# Patient Record
Sex: FEMALE | Race: WHITE | NOT HISPANIC OR LATINO
[De-identification: names, ages, dates, MRNs, and addresses within clinical notes are randomized per-mention and may not be internally consistent; named-entity substitution may affect disease eponyms.]

---

## 2017-04-11 ENCOUNTER — APPOINTMENT (OUTPATIENT)
Dept: GASTROENTEROLOGY | Facility: CLINIC | Age: 67
End: 2017-04-11

## 2017-04-11 VITALS
DIASTOLIC BLOOD PRESSURE: 84 MMHG | WEIGHT: 165 LBS | RESPIRATION RATE: 12 BRPM | BODY MASS INDEX: 28.17 KG/M2 | SYSTOLIC BLOOD PRESSURE: 134 MMHG | HEART RATE: 70 BPM | HEIGHT: 64 IN

## 2017-04-11 DIAGNOSIS — Z82.0 FAMILY HISTORY OF EPILEPSY AND OTHER DISEASES OF THE NERVOUS SYSTEM: ICD-10-CM

## 2017-04-11 DIAGNOSIS — Z82.3 FAMILY HISTORY OF STROKE: ICD-10-CM

## 2017-04-11 DIAGNOSIS — Z78.9 OTHER SPECIFIED HEALTH STATUS: ICD-10-CM

## 2017-04-11 DIAGNOSIS — E11.9 TYPE 2 DIABETES MELLITUS W/OUT COMPLICATIONS: ICD-10-CM

## 2017-04-11 DIAGNOSIS — Z86.39 PERSONAL HISTORY OF OTHER ENDOCRINE, NUTRITIONAL AND METABOLIC DISEASE: ICD-10-CM

## 2017-04-11 DIAGNOSIS — Z87.891 PERSONAL HISTORY OF NICOTINE DEPENDENCE: ICD-10-CM

## 2017-04-11 RX ORDER — FLUTICASONE PROPIONATE 50 UG/1
50 SPRAY, METERED NASAL
Qty: 16 | Refills: 0 | Status: ACTIVE | COMMUNITY
Start: 2017-02-13

## 2017-04-11 RX ORDER — PAROXETINE HYDROCHLORIDE 20 MG/1
20 TABLET, FILM COATED ORAL
Qty: 90 | Refills: 0 | Status: ACTIVE | COMMUNITY
Start: 2015-11-16

## 2017-04-11 RX ORDER — ATORVASTATIN CALCIUM 40 MG/1
40 TABLET, FILM COATED ORAL
Qty: 90 | Refills: 0 | Status: ACTIVE | COMMUNITY
Start: 2015-11-16

## 2017-04-11 RX ORDER — METHYLPREDNISOLONE 4 MG/1
4 TABLET ORAL
Qty: 21 | Refills: 0 | Status: ACTIVE | COMMUNITY
Start: 2017-02-13

## 2017-04-11 RX ORDER — LOSARTAN POTASSIUM 100 MG/1
100 TABLET, FILM COATED ORAL
Qty: 90 | Refills: 0 | Status: ACTIVE | COMMUNITY
Start: 2016-02-18

## 2017-05-22 ENCOUNTER — MEDICATION RENEWAL (OUTPATIENT)
Age: 67
End: 2017-05-22

## 2017-05-22 RX ORDER — POLYETHYLENE GLYCOL 3350, SODIUM SULFATE, SODIUM CHLORIDE, POTASSIUM CHLORIDE, ASCORBIC ACID, SODIUM ASCORBATE 7.5-2.691G
100 KIT ORAL
Qty: 1 | Refills: 0 | Status: ACTIVE | COMMUNITY
Start: 2017-05-22 | End: 1900-01-01

## 2017-05-23 ENCOUNTER — MEDICATION RENEWAL (OUTPATIENT)
Age: 67
End: 2017-05-23

## 2017-05-23 RX ORDER — SODIUM SULFATE, POTASSIUM SULFATE, MAGNESIUM SULFATE 17.5; 3.13; 1.6 G/ML; G/ML; G/ML
17.5-3.13-1.6 SOLUTION, CONCENTRATE ORAL
Qty: 1 | Refills: 0 | Status: ACTIVE | COMMUNITY
Start: 2017-05-23 | End: 1900-01-01

## 2017-06-17 DIAGNOSIS — Z12.11 ENCOUNTER FOR SCREENING FOR MALIGNANT NEOPLASM OF COLON: ICD-10-CM

## 2017-06-17 DIAGNOSIS — R19.5 OTHER FECAL ABNORMALITIES: ICD-10-CM

## 2017-06-17 LAB
ANION GAP SERPL CALC-SCNC: 17 MMOL/L
BASOPHILS # BLD AUTO: 0.03 K/UL
BASOPHILS NFR BLD AUTO: 0.4 %
BUN SERPL-MCNC: 26 MG/DL
CALCIUM SERPL-MCNC: 10 MG/DL
CHLORIDE SERPL-SCNC: 102 MMOL/L
CO2 SERPL-SCNC: 24 MMOL/L
CREAT SERPL-MCNC: 0.92 MG/DL
EOSINOPHIL # BLD AUTO: 0.4 K/UL
EOSINOPHIL NFR BLD AUTO: 5.3 %
GLUCOSE SERPL-MCNC: 135 MG/DL
HCT VFR BLD CALC: 40 %
HGB BLD-MCNC: 12.9 G/DL
IMM GRANULOCYTES NFR BLD AUTO: 0 %
INR PPP: 0.89 RATIO
LYMPHOCYTES # BLD AUTO: 3.18 K/UL
LYMPHOCYTES NFR BLD AUTO: 42.3 %
MAN DIFF?: NORMAL
MCHC RBC-ENTMCNC: 30.5 PG
MCHC RBC-ENTMCNC: 32.3 GM/DL
MCV RBC AUTO: 94.6 FL
MONOCYTES # BLD AUTO: 0.57 K/UL
MONOCYTES NFR BLD AUTO: 7.6 %
NEUTROPHILS # BLD AUTO: 3.33 K/UL
NEUTROPHILS NFR BLD AUTO: 44.4 %
PLATELET # BLD AUTO: 286 K/UL
POTASSIUM SERPL-SCNC: 5.2 MMOL/L
PT BLD: 10 SEC
RBC # BLD: 4.23 M/UL
RBC # FLD: 13.7 %
SODIUM SERPL-SCNC: 143 MMOL/L
WBC # FLD AUTO: 7.51 K/UL

## 2017-06-20 ENCOUNTER — OUTPATIENT (OUTPATIENT)
Dept: OUTPATIENT SERVICES | Facility: HOSPITAL | Age: 67
LOS: 1 days | End: 2017-06-20
Payer: MEDICARE

## 2017-06-20 ENCOUNTER — APPOINTMENT (OUTPATIENT)
Dept: GASTROENTEROLOGY | Facility: GI CENTER | Age: 67
End: 2017-06-20

## 2017-06-20 ENCOUNTER — RESULT REVIEW (OUTPATIENT)
Age: 67
End: 2017-06-20

## 2017-06-20 DIAGNOSIS — Z12.11 ENCOUNTER FOR SCREENING FOR MALIGNANT NEOPLASM OF COLON: ICD-10-CM

## 2017-06-20 PROBLEM — R19.5 FECAL OCCULT BLOOD TEST POSITIVE: Status: ACTIVE | Noted: 2017-04-11

## 2017-06-20 PROCEDURE — 88305 TISSUE EXAM BY PATHOLOGIST: CPT | Mod: 26

## 2017-06-20 PROCEDURE — 45385 COLONOSCOPY W/LESION REMOVAL: CPT

## 2017-06-20 PROCEDURE — 88305 TISSUE EXAM BY PATHOLOGIST: CPT

## 2017-06-20 RX ORDER — POLYETHYLENE GLYCOL 3350, SODIUM SULFATE, SODIUM CHLORIDE, POTASSIUM CHLORIDE, ASCORBIC ACID, SODIUM ASCORBATE 7.5-2.691G
100 KIT ORAL
Qty: 1 | Refills: 0 | Status: COMPLETED | COMMUNITY
Start: 2017-04-11 | End: 2017-06-20

## 2017-06-22 LAB — SURGICAL PATHOLOGY FINAL REPORT - CH: SIGNIFICANT CHANGE UP

## 2020-12-15 PROBLEM — Z12.11 ENCOUNTER FOR SCREENING COLONOSCOPY: Status: RESOLVED | Noted: 2017-04-11 | Resolved: 2020-12-15

## 2021-12-22 ENCOUNTER — RESULT REVIEW (OUTPATIENT)
Age: 71
End: 2021-12-22

## 2021-12-22 ENCOUNTER — OUTPATIENT (OUTPATIENT)
Dept: OUTPATIENT SERVICES | Facility: HOSPITAL | Age: 71
LOS: 1 days | End: 2021-12-22
Payer: MEDICARE

## 2021-12-22 VITALS
RESPIRATION RATE: 20 BRPM | DIASTOLIC BLOOD PRESSURE: 73 MMHG | WEIGHT: 169.76 LBS | HEIGHT: 63 IN | OXYGEN SATURATION: 100 % | HEART RATE: 64 BPM | TEMPERATURE: 97 F | SYSTOLIC BLOOD PRESSURE: 148 MMHG

## 2021-12-22 DIAGNOSIS — Z98.890 OTHER SPECIFIED POSTPROCEDURAL STATES: Chronic | ICD-10-CM

## 2021-12-22 DIAGNOSIS — Z01.818 ENCOUNTER FOR OTHER PREPROCEDURAL EXAMINATION: ICD-10-CM

## 2021-12-22 DIAGNOSIS — Z29.9 ENCOUNTER FOR PROPHYLACTIC MEASURES, UNSPECIFIED: ICD-10-CM

## 2021-12-22 DIAGNOSIS — M50.220 OTHER CERVICAL DISC DISPLACEMENT, MID-CERVICAL REGION, UNSPECIFIED LEVEL: ICD-10-CM

## 2021-12-22 DIAGNOSIS — I10 ESSENTIAL (PRIMARY) HYPERTENSION: ICD-10-CM

## 2021-12-22 DIAGNOSIS — E11.9 TYPE 2 DIABETES MELLITUS WITHOUT COMPLICATIONS: ICD-10-CM

## 2021-12-22 LAB
A1C WITH ESTIMATED AVERAGE GLUCOSE RESULT: 7.8 % — HIGH (ref 4–5.6)
ANION GAP SERPL CALC-SCNC: 14 MMOL/L — SIGNIFICANT CHANGE UP (ref 5–17)
APTT BLD: 31.6 SEC — SIGNIFICANT CHANGE UP (ref 27.5–35.5)
BASOPHILS # BLD AUTO: 0.04 K/UL — SIGNIFICANT CHANGE UP (ref 0–0.2)
BASOPHILS NFR BLD AUTO: 0.4 % — SIGNIFICANT CHANGE UP (ref 0–2)
BLD GP AB SCN SERPL QL: SIGNIFICANT CHANGE UP
BUN SERPL-MCNC: 22 MG/DL — HIGH (ref 8–20)
CALCIUM SERPL-MCNC: 8.8 MG/DL — SIGNIFICANT CHANGE UP (ref 8.6–10.2)
CHLORIDE SERPL-SCNC: 104 MMOL/L — SIGNIFICANT CHANGE UP (ref 98–107)
CO2 SERPL-SCNC: 24 MMOL/L — SIGNIFICANT CHANGE UP (ref 22–29)
CREAT SERPL-MCNC: 1.04 MG/DL — SIGNIFICANT CHANGE UP (ref 0.5–1.3)
EOSINOPHIL # BLD AUTO: 0.25 K/UL — SIGNIFICANT CHANGE UP (ref 0–0.5)
EOSINOPHIL NFR BLD AUTO: 2.8 % — SIGNIFICANT CHANGE UP (ref 0–6)
ESTIMATED AVERAGE GLUCOSE: 177 MG/DL — HIGH (ref 68–114)
GLUCOSE SERPL-MCNC: 277 MG/DL — HIGH (ref 70–99)
HCT VFR BLD CALC: 38 % — SIGNIFICANT CHANGE UP (ref 34.5–45)
HGB BLD-MCNC: 12.5 G/DL — SIGNIFICANT CHANGE UP (ref 11.5–15.5)
INR BLD: 1.06 RATIO — SIGNIFICANT CHANGE UP (ref 0.88–1.16)
LYMPHOCYTES # BLD AUTO: 1.59 K/UL — SIGNIFICANT CHANGE UP (ref 1–3.3)
LYMPHOCYTES # BLD AUTO: 17.7 % — SIGNIFICANT CHANGE UP (ref 13–44)
MCHC RBC-ENTMCNC: 31.3 PG — SIGNIFICANT CHANGE UP (ref 27–34)
MCHC RBC-ENTMCNC: 32.9 GM/DL — SIGNIFICANT CHANGE UP (ref 32–36)
MCV RBC AUTO: 95 FL — SIGNIFICANT CHANGE UP (ref 80–100)
MONOCYTES # BLD AUTO: 0.5 K/UL — SIGNIFICANT CHANGE UP (ref 0–0.9)
MONOCYTES NFR BLD AUTO: 5.6 % — SIGNIFICANT CHANGE UP (ref 2–14)
MRSA PCR RESULT.: SIGNIFICANT CHANGE UP
NEUTROPHILS # BLD AUTO: 6.54 K/UL — SIGNIFICANT CHANGE UP (ref 1.8–7.4)
NEUTROPHILS NFR BLD AUTO: 73.1 % — SIGNIFICANT CHANGE UP (ref 43–77)
PLATELET # BLD AUTO: 238 K/UL — SIGNIFICANT CHANGE UP (ref 150–400)
POTASSIUM SERPL-MCNC: 3.8 MMOL/L — SIGNIFICANT CHANGE UP (ref 3.5–5.3)
POTASSIUM SERPL-SCNC: 3.8 MMOL/L — SIGNIFICANT CHANGE UP (ref 3.5–5.3)
PROTHROM AB SERPL-ACNC: 12.3 SEC — SIGNIFICANT CHANGE UP (ref 10.6–13.6)
RBC # BLD: 4 M/UL — SIGNIFICANT CHANGE UP (ref 3.8–5.2)
RBC # FLD: 12.7 % — SIGNIFICANT CHANGE UP (ref 10.3–14.5)
S AUREUS DNA NOSE QL NAA+PROBE: SIGNIFICANT CHANGE UP
SODIUM SERPL-SCNC: 142 MMOL/L — SIGNIFICANT CHANGE UP (ref 135–145)
WBC # BLD: 8.96 K/UL — SIGNIFICANT CHANGE UP (ref 3.8–10.5)
WBC # FLD AUTO: 8.96 K/UL — SIGNIFICANT CHANGE UP (ref 3.8–10.5)

## 2021-12-22 PROCEDURE — 93010 ELECTROCARDIOGRAM REPORT: CPT

## 2021-12-22 PROCEDURE — 71046 X-RAY EXAM CHEST 2 VIEWS: CPT | Mod: 26

## 2021-12-22 PROCEDURE — G0463: CPT

## 2021-12-22 PROCEDURE — 93005 ELECTROCARDIOGRAM TRACING: CPT

## 2021-12-22 PROCEDURE — 71046 X-RAY EXAM CHEST 2 VIEWS: CPT

## 2021-12-22 NOTE — ASU PATIENT PROFILE, ADULT - FALL HARM RISK - HARM RISK INTERVENTIONS

## 2021-12-22 NOTE — H&P PST ADULT - PROBLEM/PLAN-3
Occupational Therapy  Facility/Department: [facility]  Daily Treatment Note  NAME: Aaron Chong  : 6/10/1929  MRN: [id number]    Date of Service: 2020    Discharge Recommendations:  Continue to assess pending progress       Assessment      Activity Tolerance  Activity Tolerance: Patient Tolerated treatment well  Safety Devices  Safety Devices in place: Yes  Type of devices: All fall risk precautions in place         Patient Diagnosis(es): There were no encounter diagnoses. 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 has a past surgical history that includes Appendectomy; Rectocele repair; Cystocele repair; Ankle surgery; Breast biopsy; Cataract removal (); eye surgery; Tonsillectomy; Hysterectomy; and Coronary angioplasty with stent (2019).     Restrictions  Restrictions/Precautions  Restrictions/Precautions: Fall Risk  Position Activity Restriction  Other position/activity restrictions: [facility]     Subjective   General  Chart Reviewed: Yes  Patient assessed for rehabilitation services?: Yes  Response to previous treatment: Patient with no complaints from previous session  Family / Caregiver Present: No  Referring Practitioner: Dr January Dover  Diagnosis: NTSCI with imp mob and ADLs 2° cervical DISPLAY PLAN FREE TEXT

## 2021-12-22 NOTE — H&P PST ADULT - MUSCULOSKELETAL
details… no joint swelling/no joint erythema/no joint warmth/no calf tenderness/decreased ROM due to pain/diminished strength detailed exam

## 2021-12-22 NOTE — H&P PST ADULT - PROBLEM SELECTOR PLAN 1
She is now schedule  for C4-C5, C5-C6, C6-C7, anterior cervical decompression and fusion on 1/11/2022

## 2021-12-22 NOTE — H&P PST ADULT - NSICDXPASTSURGICALHX_GEN_ALL_CORE_FT
PAST SURGICAL HISTORY:  H/O foot surgery     S/p bilateral carpal tunnel release     S/P shoulder surgery     S/P transposition of nerve

## 2021-12-22 NOTE — H&P PST ADULT - ASSESSMENT
CAPRINI SCORE [CLOT]    AGE RELATED RISK FACTORS                                                       MOBILITY RELATED FACTORS  [ ] Age 41-60 years                                            (1 Point)                  [ ] Bed rest                                                        (1 Point)  [x ] Age: 61-74 years                                           (2 Points)                 [ ] Plaster cast                                                   (2 Points)  [ ] Age= 75 years                                              (3 Points)                   [ ] Bed bound for more than 72 hours                 (2 Points)    DISEASE RELATED RISK FACTORS                                               GENDER SPECIFIC FACTORS  [ ] Edema in the lower extremities                       (1 Point)              [ ] Pregnancy                                                     (1 Point)  [ x] Varicose veins                                               (1 Point)                     [ ] Post-partum < 6 weeks                                   (1 Point)             [x ] BMI > 25 Kg/m2                                            (1 Point)                     [ ] Hormonal therapy  or oral contraception          (1 Point)                 [ ] Sepsis (in the previous month)                        (1 Point)                [ ] History of pregnancy complications                 (1 point)  [ ] Pneumonia or serious lung disease                                                [ ] Unexplained or recurrent                     (1 Point)           (in the previous month)                               (1 Point)  [ ] Abnormal pulmonary function test                     (1 Point)                 SURGERY RELATED RISK FACTORS  [ ] Acute myocardial infarction                              (1 Point)                   [ ]  Section                                             (1 Point)  [ ] Congestive heart failure (in the previous month)  (1 Point)           [ ] Minor surgery                                                  (1 Point)   [ ] Inflammatory bowel disease                             (1 Point)                   [ ] Arthroscopic surgery                                        (2 Points)  [ ] Central venous access                                      (2 Points)                    [x ] General surgery lasting more than 45 minutes   (2 Points)       [ ] Stroke (in the previous month)                          (5 Points)                 [ ] Elective arthroplasty                                         (5 Points)            [ ] Malignacy (past or present)                          (2 ponits)                                                                                                                            HEMATOLOGY RELATED FACTORS                                                 TRAUMA RELATED RISK FACTORS  [ ] Prior episodes of VTE                                     (3 Points)                [ ] Fracture of the hip, pelvis, or leg                       (5 Points)  [ ] Positive family history for VTE                         (3 Points)             [ ] Acute spinal cord injury (in the previous month)  (5 Points)  [ ] Prothrombin 34664 A                                     (3 Points)                [ ] Paralysis  (less than 1 month)                             (5 Points)  [ ] Factor V Leiden                                             (3 Points)                   [ ] Multiple Trauma within 1 month                        (5 Points)  [ ] Lupus anticoagulants                                     (3 Points)                                                           [ ] Anticardiolipin antibodies                               (3 Points)                                                       [ ] High homocysteine in the blood                      (3 Points)                                             [ ] Other congenital or acquired thrombophilia      (3 Points)                                                [ ] Heparin induced thrombocytopenia                  (3 Points)                                          Total Score [   6       ]    Caprini Score 0 - 2:  Low Risk, No VTE Prophylaxis required for most patients, encourage ambulation  Caprini Score 3 - 6:  At Risk, pharmacologic VTE prophylaxis is indicated for most patients (in the absence of a contraindication)  Caprini Score Greater than or = 7:  High Risk, pharmacologic VTE prophylaxis is indicated for most patients (in the absence of a contraindication)  71 year old female with persistent neck pain since her trip and fall in 2021.  She report pain is worst when turning her head to the right . She report having imaging of head and neck and was told negative for bleed  noted degenerative disc disease and spinal stenosis.  Patient reports she was told spinal cord compression was also noted on imaging.  She is now schedule  for C4-C5, C5-C6, C6-C7, anterior cervical decompression and fusion on 2Patient educated on written and verbal preop instructions.    Patient verbalized understanding after "teach back" method of instruction were given   Medications reviewed, instructions given on what medications to take and what not to take.  Pt instructed to stop Herbals or anti-inflammatory meds one week prior to surgery and discuss with PMD.

## 2021-12-22 NOTE — H&P PST ADULT - HISTORY OF PRESENT ILLNESS
71 year old female present today for pst . She report persistent neck pain since her trip and fall in Feb 2021.  She report pain is worst when turning her head to the right . She report having imaging of head and neck and was told negative for bleed  noted degenerative disc disease and spinal stenosis.  Patient reports she was told spinal cord compression was also noted on imaging.  pt reports she has urinary incontinence at baseline. denies bowel dysfunction denies pain down the arm but report hand weakness and has dropped objects in past.   denies numbness or tingle down the arm. Denies any prior interventions or LORRAINE.  reports treating pain with OTC anti inflammatories. She is now schedule   71 year old female present today for pst . She report persistent neck pain since her trip and fall in Feb 2021.  She report pain is worst when turning her head to the right . She report having imaging of head and neck and was told negative for bleed  noted degenerative disc disease and spinal stenosis.  Patient reports she was told spinal cord compression was also noted on imaging.  pt reports she has urinary incontinence at baseline. denies bowel dysfunction denies pain down the arm but report hand weakness and has dropped objects in past.   denies numbness or tingle down the arm. Denies any prior interventions or LORRAINE.  reports treating pain with OTC anti inflammatories. She is now schedule  for C4-C5, C5-C6, C6-C7, anterior cervical decompression and fusion on 1/11/2022

## 2021-12-22 NOTE — H&P PST ADULT - NSICDXFAMILYHX_GEN_ALL_CORE_FT
FAMILY HISTORY:  Mother  Still living? Unknown  FH: dementia, Age at diagnosis: Age Unknown  FH: HTN (hypertension), Age at diagnosis: Age Unknown    Sibling  Still living? Unknown  FH: spinal stenosis, Age at diagnosis: Age Unknown

## 2021-12-22 NOTE — H&P PST ADULT - PROBLEM SELECTOR PLAN 2
routine labs and ekg  medical clearance with [facility] [phone]  Asked the patient to take the Blood pressure medication/ heart medication on DOP.

## 2021-12-22 NOTE — ASU PATIENT PROFILE, ADULT - VISION (WITH CORRECTIVE LENSES IF THE PATIENT USUALLY WEARS THEM):
Normal vision: sees adequately in most situations; can see medication labels, newsprint 09-Feb-2020 15:25

## 2021-12-22 NOTE — H&P PST ADULT - NSICDXPASTMEDICALHX_GEN_ALL_CORE_FT
PAST MEDICAL HISTORY:  Anxiety     Diabetes mellitus     H/O rotator cuff tear     HLD (hyperlipidemia)     HTN (hypertension)     Left bundle branch block

## 2022-01-13 PROBLEM — E78.5 HYPERLIPIDEMIA, UNSPECIFIED: Chronic | Status: ACTIVE | Noted: 2021-12-22

## 2022-01-13 PROBLEM — E11.9 TYPE 2 DIABETES MELLITUS WITHOUT COMPLICATIONS: Chronic | Status: ACTIVE | Noted: 2021-12-22

## 2022-01-13 PROBLEM — I44.7 LEFT BUNDLE-BRANCH BLOCK, UNSPECIFIED: Chronic | Status: ACTIVE | Noted: 2021-12-22

## 2022-01-13 PROBLEM — Z87.39 PERSONAL HISTORY OF OTHER DISEASES OF THE MUSCULOSKELETAL SYSTEM AND CONNECTIVE TISSUE: Chronic | Status: ACTIVE | Noted: 2021-12-22

## 2022-01-13 PROBLEM — F41.9 ANXIETY DISORDER, UNSPECIFIED: Chronic | Status: ACTIVE | Noted: 2021-12-22

## 2022-01-13 PROBLEM — I10 ESSENTIAL (PRIMARY) HYPERTENSION: Chronic | Status: ACTIVE | Noted: 2021-12-22

## 2022-01-19 ENCOUNTER — OUTPATIENT (OUTPATIENT)
Dept: OUTPATIENT SERVICES | Facility: HOSPITAL | Age: 72
LOS: 1 days | End: 2022-01-19
Payer: MEDICARE

## 2022-01-19 DIAGNOSIS — Z98.890 OTHER SPECIFIED POSTPROCEDURAL STATES: Chronic | ICD-10-CM

## 2022-01-19 DIAGNOSIS — Z20.828 CONTACT WITH AND (SUSPECTED) EXPOSURE TO OTHER VIRAL COMMUNICABLE DISEASES: ICD-10-CM

## 2022-01-19 LAB — SARS-COV-2 RNA SPEC QL NAA+PROBE: DETECTED

## 2022-01-19 PROCEDURE — U0005: CPT

## 2022-01-19 PROCEDURE — U0003: CPT

## 2022-02-18 ENCOUNTER — OUTPATIENT (OUTPATIENT)
Dept: OUTPATIENT SERVICES | Facility: HOSPITAL | Age: 72
LOS: 1 days | End: 2022-02-18
Payer: MEDICARE

## 2022-02-18 DIAGNOSIS — Z98.890 OTHER SPECIFIED POSTPROCEDURAL STATES: Chronic | ICD-10-CM

## 2022-02-18 DIAGNOSIS — Z20.828 CONTACT WITH AND (SUSPECTED) EXPOSURE TO OTHER VIRAL COMMUNICABLE DISEASES: ICD-10-CM

## 2022-02-18 DIAGNOSIS — Z01.812 ENCOUNTER FOR PREPROCEDURAL LABORATORY EXAMINATION: ICD-10-CM

## 2022-02-18 LAB
A1C WITH ESTIMATED AVERAGE GLUCOSE RESULT: 7.8 % — HIGH (ref 4–5.6)
ANION GAP SERPL CALC-SCNC: 11 MMOL/L — SIGNIFICANT CHANGE UP (ref 5–17)
APTT BLD: 32.2 SEC — SIGNIFICANT CHANGE UP (ref 27.5–35.5)
BASOPHILS # BLD AUTO: 0.06 K/UL — SIGNIFICANT CHANGE UP (ref 0–0.2)
BASOPHILS NFR BLD AUTO: 0.8 % — SIGNIFICANT CHANGE UP (ref 0–2)
BLD GP AB SCN SERPL QL: SIGNIFICANT CHANGE UP
BUN SERPL-MCNC: 28.6 MG/DL — HIGH (ref 8–20)
CALCIUM SERPL-MCNC: 9.6 MG/DL — SIGNIFICANT CHANGE UP (ref 8.6–10.2)
CHLORIDE SERPL-SCNC: 99 MMOL/L — SIGNIFICANT CHANGE UP (ref 98–107)
CO2 SERPL-SCNC: 26 MMOL/L — SIGNIFICANT CHANGE UP (ref 22–29)
CREAT SERPL-MCNC: 1.02 MG/DL — SIGNIFICANT CHANGE UP (ref 0.5–1.3)
EOSINOPHIL # BLD AUTO: 0.15 K/UL — SIGNIFICANT CHANGE UP (ref 0–0.5)
EOSINOPHIL NFR BLD AUTO: 2 % — SIGNIFICANT CHANGE UP (ref 0–6)
ESTIMATED AVERAGE GLUCOSE: 177 MG/DL — HIGH (ref 68–114)
GLUCOSE SERPL-MCNC: 303 MG/DL — HIGH (ref 70–99)
HCT VFR BLD CALC: 36 % — SIGNIFICANT CHANGE UP (ref 34.5–45)
HGB BLD-MCNC: 12 G/DL — SIGNIFICANT CHANGE UP (ref 11.5–15.5)
IMM GRANULOCYTES NFR BLD AUTO: 0.3 % — SIGNIFICANT CHANGE UP (ref 0–1.5)
INR BLD: 1.03 RATIO — SIGNIFICANT CHANGE UP (ref 0.88–1.16)
LYMPHOCYTES # BLD AUTO: 2.09 K/UL — SIGNIFICANT CHANGE UP (ref 1–3.3)
LYMPHOCYTES # BLD AUTO: 28.2 % — SIGNIFICANT CHANGE UP (ref 13–44)
MCHC RBC-ENTMCNC: 30.8 PG — SIGNIFICANT CHANGE UP (ref 27–34)
MCHC RBC-ENTMCNC: 33.3 GM/DL — SIGNIFICANT CHANGE UP (ref 32–36)
MCV RBC AUTO: 92.5 FL — SIGNIFICANT CHANGE UP (ref 80–100)
MONOCYTES # BLD AUTO: 0.4 K/UL — SIGNIFICANT CHANGE UP (ref 0–0.9)
MONOCYTES NFR BLD AUTO: 5.4 % — SIGNIFICANT CHANGE UP (ref 2–14)
MRSA PCR RESULT.: SIGNIFICANT CHANGE UP
NEUTROPHILS # BLD AUTO: 4.69 K/UL — SIGNIFICANT CHANGE UP (ref 1.8–7.4)
NEUTROPHILS NFR BLD AUTO: 63.3 % — SIGNIFICANT CHANGE UP (ref 43–77)
PLATELET # BLD AUTO: 242 K/UL — SIGNIFICANT CHANGE UP (ref 150–400)
POTASSIUM SERPL-MCNC: 4.5 MMOL/L — SIGNIFICANT CHANGE UP (ref 3.5–5.3)
POTASSIUM SERPL-SCNC: 4.5 MMOL/L — SIGNIFICANT CHANGE UP (ref 3.5–5.3)
PROTHROM AB SERPL-ACNC: 11.9 SEC — SIGNIFICANT CHANGE UP (ref 10.6–13.6)
RBC # BLD: 3.89 M/UL — SIGNIFICANT CHANGE UP (ref 3.8–5.2)
RBC # FLD: 13.3 % — SIGNIFICANT CHANGE UP (ref 10.3–14.5)
S AUREUS DNA NOSE QL NAA+PROBE: SIGNIFICANT CHANGE UP
SODIUM SERPL-SCNC: 136 MMOL/L — SIGNIFICANT CHANGE UP (ref 135–145)
WBC # BLD: 7.41 K/UL — SIGNIFICANT CHANGE UP (ref 3.8–10.5)
WBC # FLD AUTO: 7.41 K/UL — SIGNIFICANT CHANGE UP (ref 3.8–10.5)

## 2022-02-18 RX ORDER — MUPIROCIN 20 MG/G
1 OINTMENT TOPICAL
Qty: 1 | Refills: 0
Start: 2022-02-18 | End: 2022-02-22

## 2022-02-18 NOTE — PATIENT PROFILE ADULT - FALL HARM RISK - HARM RISK INTERVENTIONS

## 2022-02-21 ENCOUNTER — TRANSCRIPTION ENCOUNTER (OUTPATIENT)
Age: 72
End: 2022-02-21

## 2022-02-22 ENCOUNTER — INPATIENT (INPATIENT)
Facility: HOSPITAL | Age: 72
LOS: 0 days | Discharge: HOME | DRG: 472 | End: 2022-02-23
Attending: NEUROLOGICAL SURGERY | Admitting: NEUROLOGICAL SURGERY
Payer: MEDICARE

## 2022-02-22 ENCOUNTER — RESULT REVIEW (OUTPATIENT)
Age: 72
End: 2022-02-22

## 2022-02-22 ENCOUNTER — TRANSCRIPTION ENCOUNTER (OUTPATIENT)
Age: 72
End: 2022-02-22

## 2022-02-22 VITALS
RESPIRATION RATE: 14 BRPM | SYSTOLIC BLOOD PRESSURE: 150 MMHG | OXYGEN SATURATION: 100 % | DIASTOLIC BLOOD PRESSURE: 110 MMHG | WEIGHT: 169.76 LBS | HEIGHT: 63 IN | HEART RATE: 66 BPM | TEMPERATURE: 98 F

## 2022-02-22 DIAGNOSIS — Z98.890 OTHER SPECIFIED POSTPROCEDURAL STATES: Chronic | ICD-10-CM

## 2022-02-22 DIAGNOSIS — M50.220 OTHER CERVICAL DISC DISPLACEMENT, MID-CERVICAL REGION, UNSPECIFIED LEVEL: ICD-10-CM

## 2022-02-22 LAB — GLUCOSE BLDC GLUCOMTR-MCNC: 159 MG/DL — HIGH (ref 70–99)

## 2022-02-22 PROCEDURE — 86900 BLOOD TYPING SEROLOGIC ABO: CPT

## 2022-02-22 PROCEDURE — 85025 COMPLETE CBC W/AUTO DIFF WBC: CPT

## 2022-02-22 PROCEDURE — 36415 COLL VENOUS BLD VENIPUNCTURE: CPT

## 2022-02-22 PROCEDURE — 85730 THROMBOPLASTIN TIME PARTIAL: CPT

## 2022-02-22 PROCEDURE — 85610 PROTHROMBIN TIME: CPT

## 2022-02-22 PROCEDURE — 86901 BLOOD TYPING SEROLOGIC RH(D): CPT

## 2022-02-22 PROCEDURE — 87640 STAPH A DNA AMP PROBE: CPT

## 2022-02-22 PROCEDURE — 88304 TISSUE EXAM BY PATHOLOGIST: CPT | Mod: 26

## 2022-02-22 PROCEDURE — 86850 RBC ANTIBODY SCREEN: CPT

## 2022-02-22 PROCEDURE — 87641 MR-STAPH DNA AMP PROBE: CPT

## 2022-02-22 PROCEDURE — 83036 HEMOGLOBIN GLYCOSYLATED A1C: CPT

## 2022-02-22 PROCEDURE — 80048 BASIC METABOLIC PNL TOTAL CA: CPT

## 2022-02-22 DEVICE — SEALANT FLOSEAL FAST PREP HEMOSTATIC MATRIX 10ML: Type: IMPLANTABLE DEVICE | Status: FUNCTIONAL

## 2022-02-22 DEVICE — IMP CASCADIA CERVICAL 7X14X12MM: Type: IMPLANTABLE DEVICE | Status: FUNCTIONAL

## 2022-02-22 DEVICE — IMP CASCADIA CERV LORD 7 DEG 12X14X6MM: Type: IMPLANTABLE DEVICE | Status: FUNCTIONAL

## 2022-02-22 DEVICE — OSTEOSELECT DBM PLUS 5CC: Type: IMPLANTABLE DEVICE | Status: FUNCTIONAL

## 2022-02-22 DEVICE — SURGIFOAM PAD SZ 100: Type: IMPLANTABLE DEVICE | Status: FUNCTIONAL

## 2022-02-22 DEVICE — SCREW OZARK SELF START 4.35X14MM: Type: IMPLANTABLE DEVICE | Status: FUNCTIONAL

## 2022-02-22 DEVICE — SPONGE GELFOAM SZ 100 UNCOMPRESSED: Type: IMPLANTABLE DEVICE | Status: FUNCTIONAL

## 2022-02-22 DEVICE — SPONGE HSTAT SURGICEL 2X14": Type: IMPLANTABLE DEVICE | Status: FUNCTIONAL

## 2022-02-22 DEVICE — PLATE 3 LVL 57MM: Type: IMPLANTABLE DEVICE | Status: FUNCTIONAL

## 2022-02-22 RX ORDER — ONDANSETRON 8 MG/1
4 TABLET, FILM COATED ORAL EVERY 6 HOURS
Refills: 0 | Status: DISCONTINUED | OUTPATIENT
Start: 2022-02-22 | End: 2022-02-23

## 2022-02-22 RX ORDER — SODIUM CHLORIDE 9 MG/ML
1000 INJECTION, SOLUTION INTRAVENOUS
Refills: 0 | Status: DISCONTINUED | OUTPATIENT
Start: 2022-02-22 | End: 2022-02-22

## 2022-02-22 RX ORDER — ATORVASTATIN CALCIUM 80 MG/1
40 TABLET, FILM COATED ORAL AT BEDTIME
Refills: 0 | Status: DISCONTINUED | OUTPATIENT
Start: 2022-02-22 | End: 2022-02-23

## 2022-02-22 RX ORDER — SACUBITRIL AND VALSARTAN 24; 26 MG/1; MG/1
1 TABLET, FILM COATED ORAL
Refills: 0 | Status: DISCONTINUED | OUTPATIENT
Start: 2022-02-22 | End: 2022-02-23

## 2022-02-22 RX ORDER — SENNA PLUS 8.6 MG/1
2 TABLET ORAL AT BEDTIME
Refills: 0 | Status: DISCONTINUED | OUTPATIENT
Start: 2022-02-22 | End: 2022-02-23

## 2022-02-22 RX ORDER — ACETAMINOPHEN 500 MG
975 TABLET ORAL ONCE
Refills: 0 | Status: COMPLETED | OUTPATIENT
Start: 2022-02-22 | End: 2022-02-22

## 2022-02-22 RX ORDER — BENZOCAINE AND MENTHOL 5; 1 G/100ML; G/100ML
1 LIQUID ORAL
Refills: 0 | Status: DISCONTINUED | OUTPATIENT
Start: 2022-02-22 | End: 2022-02-23

## 2022-02-22 RX ORDER — ACETAMINOPHEN 500 MG
1000 TABLET ORAL ONCE
Refills: 0 | Status: DISCONTINUED | OUTPATIENT
Start: 2022-02-22 | End: 2022-02-22

## 2022-02-22 RX ORDER — CEFAZOLIN SODIUM 1 G
2000 VIAL (EA) INJECTION EVERY 8 HOURS
Refills: 0 | Status: COMPLETED | OUTPATIENT
Start: 2022-02-22 | End: 2022-02-23

## 2022-02-22 RX ORDER — AMLODIPINE BESYLATE 2.5 MG/1
2.5 TABLET ORAL DAILY
Refills: 0 | Status: DISCONTINUED | OUTPATIENT
Start: 2022-02-22 | End: 2022-02-23

## 2022-02-22 RX ORDER — ACETAMINOPHEN 500 MG
650 TABLET ORAL EVERY 6 HOURS
Refills: 0 | Status: DISCONTINUED | OUTPATIENT
Start: 2022-02-22 | End: 2022-02-22

## 2022-02-22 RX ORDER — ACETAMINOPHEN 500 MG
650 TABLET ORAL EVERY 6 HOURS
Refills: 0 | Status: DISCONTINUED | OUTPATIENT
Start: 2022-02-22 | End: 2022-02-23

## 2022-02-22 RX ORDER — SODIUM CHLORIDE 9 MG/ML
1000 INJECTION INTRAMUSCULAR; INTRAVENOUS; SUBCUTANEOUS
Refills: 0 | Status: DISCONTINUED | OUTPATIENT
Start: 2022-02-22 | End: 2022-02-23

## 2022-02-22 RX ORDER — FENTANYL CITRATE 50 UG/ML
25 INJECTION INTRAVENOUS
Refills: 0 | Status: DISCONTINUED | OUTPATIENT
Start: 2022-02-22 | End: 2022-02-22

## 2022-02-22 RX ORDER — CARVEDILOL PHOSPHATE 80 MG/1
3.12 CAPSULE, EXTENDED RELEASE ORAL EVERY 12 HOURS
Refills: 0 | Status: DISCONTINUED | OUTPATIENT
Start: 2022-02-22 | End: 2022-02-23

## 2022-02-22 RX ORDER — SODIUM CHLORIDE 9 MG/ML
3 INJECTION INTRAMUSCULAR; INTRAVENOUS; SUBCUTANEOUS ONCE
Refills: 0 | Status: DISCONTINUED | OUTPATIENT
Start: 2022-02-22 | End: 2022-02-22

## 2022-02-22 RX ORDER — CEFAZOLIN SODIUM 1 G
2000 VIAL (EA) INJECTION ONCE
Refills: 0 | Status: DISCONTINUED | OUTPATIENT
Start: 2022-02-22 | End: 2022-02-23

## 2022-02-22 RX ORDER — METFORMIN HYDROCHLORIDE 850 MG/1
250 TABLET ORAL
Refills: 0 | Status: DISCONTINUED | OUTPATIENT
Start: 2022-02-22 | End: 2022-02-23

## 2022-02-22 RX ORDER — METHOCARBAMOL 500 MG/1
500 TABLET, FILM COATED ORAL EVERY 8 HOURS
Refills: 0 | Status: DISCONTINUED | OUTPATIENT
Start: 2022-02-22 | End: 2022-02-23

## 2022-02-22 RX ORDER — OXYCODONE HYDROCHLORIDE 5 MG/1
5 TABLET ORAL EVERY 4 HOURS
Refills: 0 | Status: DISCONTINUED | OUTPATIENT
Start: 2022-02-22 | End: 2022-02-23

## 2022-02-22 RX ADMIN — SACUBITRIL AND VALSARTAN 1 TABLET(S): 24; 26 TABLET, FILM COATED ORAL at 17:26

## 2022-02-22 RX ADMIN — OXYCODONE HYDROCHLORIDE 5 MILLIGRAM(S): 5 TABLET ORAL at 20:25

## 2022-02-22 RX ADMIN — Medication 100 MILLIGRAM(S): at 22:34

## 2022-02-22 RX ADMIN — CARVEDILOL PHOSPHATE 3.12 MILLIGRAM(S): 80 CAPSULE, EXTENDED RELEASE ORAL at 17:26

## 2022-02-22 RX ADMIN — SODIUM CHLORIDE 50 MILLILITER(S): 9 INJECTION INTRAMUSCULAR; INTRAVENOUS; SUBCUTANEOUS at 15:49

## 2022-02-22 RX ADMIN — ATORVASTATIN CALCIUM 40 MILLIGRAM(S): 80 TABLET, FILM COATED ORAL at 22:33

## 2022-02-22 RX ADMIN — OXYCODONE HYDROCHLORIDE 5 MILLIGRAM(S): 5 TABLET ORAL at 21:00

## 2022-02-22 RX ADMIN — Medication 100 MILLIGRAM(S): at 15:49

## 2022-02-22 RX ADMIN — Medication 975 MILLIGRAM(S): at 07:57

## 2022-02-22 RX ADMIN — METFORMIN HYDROCHLORIDE 250 MILLIGRAM(S): 850 TABLET ORAL at 17:26

## 2022-02-22 RX ADMIN — METHOCARBAMOL 500 MILLIGRAM(S): 500 TABLET, FILM COATED ORAL at 15:49

## 2022-02-22 RX ADMIN — METHOCARBAMOL 500 MILLIGRAM(S): 500 TABLET, FILM COATED ORAL at 22:33

## 2022-02-22 NOTE — PHYSICAL THERAPY INITIAL EVALUATION ADULT - GENERAL OBSERVATIONS, REHAB EVAL
Pt received in bed, + IV Loc, +  present, breathing on RA in NAD, in 0/10 pain, agreeable to PT evaluation

## 2022-02-22 NOTE — BRIEF OPERATIVE NOTE - NSICDXBRIEFOPLAUNCH_GEN_ALL_CORE
<--- Click to Launch ICDx for PreOp, PostOp and Procedure Alert-The patient is alert, awake and responds to voice. The patient is oriented to time, place, and person. The triage nurse is able to obtain subjective information.

## 2022-02-22 NOTE — PHYSICAL THERAPY INITIAL EVALUATION ADULT - ADDITIONAL COMMENTS
Pt reports living in private home with  who is able to assist. Pt has 1 step to enter with a wall on one side and all needs met on the first floor. Pt independent prior to admission. Owns no DME. Pt drives & is retired.

## 2022-02-22 NOTE — BRIEF OPERATIVE NOTE - NSICDXBRIEFPREOP_GEN_ALL_CORE_FT
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

## 2022-02-22 NOTE — PROGRESS NOTE ADULT - SUBJECTIVE AND OBJECTIVE BOX
Neurosurgery Post OP Note     Patient w/o complaints; laying comfortably in bed   Afebrile VSS   Awake, alert and oriented x3   speech clear; trachea midline   CASTANEDA x4 with 5/5 strength throughout   following commands briskly   Dressing C/D/I     POD#0 s/p C4-7 ACDF  - doing well postop  - continue current pain regimen will adjust as needed   - to floor with tele monitoring   - OOB   - no collar required   - PT/OT  - SCDS   - incentive spirometry   - dispo planning

## 2022-02-22 NOTE — BRIEF OPERATIVE NOTE - NSICDXBRIEFPOSTOP_GEN_ALL_CORE_FT
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

## 2022-02-23 ENCOUNTER — TRANSCRIPTION ENCOUNTER (OUTPATIENT)
Age: 72
End: 2022-02-23

## 2022-02-23 VITALS
OXYGEN SATURATION: 95 % | DIASTOLIC BLOOD PRESSURE: 60 MMHG | RESPIRATION RATE: 18 BRPM | TEMPERATURE: 98 F | SYSTOLIC BLOOD PRESSURE: 132 MMHG | HEART RATE: 58 BPM

## 2022-02-23 LAB
ALBUMIN SERPL ELPH-MCNC: 3.6 G/DL — SIGNIFICANT CHANGE UP (ref 3.3–5.2)
ALP SERPL-CCNC: 113 U/L — SIGNIFICANT CHANGE UP (ref 40–120)
ALT FLD-CCNC: 8 U/L — SIGNIFICANT CHANGE UP
ANION GAP SERPL CALC-SCNC: 13 MMOL/L — SIGNIFICANT CHANGE UP (ref 5–17)
AST SERPL-CCNC: 18 U/L — SIGNIFICANT CHANGE UP
BASOPHILS # BLD AUTO: 0.01 K/UL — SIGNIFICANT CHANGE UP (ref 0–0.2)
BASOPHILS NFR BLD AUTO: 0.1 % — SIGNIFICANT CHANGE UP (ref 0–2)
BILIRUB SERPL-MCNC: 0.4 MG/DL — SIGNIFICANT CHANGE UP (ref 0.4–2)
BUN SERPL-MCNC: 18 MG/DL — SIGNIFICANT CHANGE UP (ref 8–20)
CALCIUM SERPL-MCNC: 8.7 MG/DL — SIGNIFICANT CHANGE UP (ref 8.6–10.2)
CHLORIDE SERPL-SCNC: 102 MMOL/L — SIGNIFICANT CHANGE UP (ref 98–107)
CO2 SERPL-SCNC: 23 MMOL/L — SIGNIFICANT CHANGE UP (ref 22–29)
CREAT SERPL-MCNC: 0.97 MG/DL — SIGNIFICANT CHANGE UP (ref 0.5–1.3)
EOSINOPHIL # BLD AUTO: 0 K/UL — SIGNIFICANT CHANGE UP (ref 0–0.5)
EOSINOPHIL NFR BLD AUTO: 0 % — SIGNIFICANT CHANGE UP (ref 0–6)
GLUCOSE SERPL-MCNC: 161 MG/DL — HIGH (ref 70–99)
HCT VFR BLD CALC: 31.7 % — LOW (ref 34.5–45)
HGB BLD-MCNC: 10.2 G/DL — LOW (ref 11.5–15.5)
IMM GRANULOCYTES NFR BLD AUTO: 0.5 % — SIGNIFICANT CHANGE UP (ref 0–1.5)
LYMPHOCYTES # BLD AUTO: 19.2 % — SIGNIFICANT CHANGE UP (ref 13–44)
LYMPHOCYTES # BLD AUTO: 2.07 K/UL — SIGNIFICANT CHANGE UP (ref 1–3.3)
MCHC RBC-ENTMCNC: 30.6 PG — SIGNIFICANT CHANGE UP (ref 27–34)
MCHC RBC-ENTMCNC: 32.2 GM/DL — SIGNIFICANT CHANGE UP (ref 32–36)
MCV RBC AUTO: 95.2 FL — SIGNIFICANT CHANGE UP (ref 80–100)
MONOCYTES # BLD AUTO: 1 K/UL — HIGH (ref 0–0.9)
MONOCYTES NFR BLD AUTO: 9.3 % — SIGNIFICANT CHANGE UP (ref 2–14)
NEUTROPHILS # BLD AUTO: 7.63 K/UL — HIGH (ref 1.8–7.4)
NEUTROPHILS NFR BLD AUTO: 70.9 % — SIGNIFICANT CHANGE UP (ref 43–77)
PLATELET # BLD AUTO: 218 K/UL — SIGNIFICANT CHANGE UP (ref 150–400)
POTASSIUM SERPL-MCNC: 4.1 MMOL/L — SIGNIFICANT CHANGE UP (ref 3.5–5.3)
POTASSIUM SERPL-SCNC: 4.1 MMOL/L — SIGNIFICANT CHANGE UP (ref 3.5–5.3)
PROT SERPL-MCNC: 6 G/DL — LOW (ref 6.6–8.7)
RBC # BLD: 3.33 M/UL — LOW (ref 3.8–5.2)
RBC # FLD: 13.3 % — SIGNIFICANT CHANGE UP (ref 10.3–14.5)
SODIUM SERPL-SCNC: 138 MMOL/L — SIGNIFICANT CHANGE UP (ref 135–145)
WBC # BLD: 10.76 K/UL — HIGH (ref 3.8–10.5)
WBC # FLD AUTO: 10.76 K/UL — HIGH (ref 3.8–10.5)

## 2022-02-23 PROCEDURE — 80053 COMPREHEN METABOLIC PANEL: CPT

## 2022-02-23 PROCEDURE — 76000 FLUOROSCOPY <1 HR PHYS/QHP: CPT

## 2022-02-23 PROCEDURE — C1889: CPT

## 2022-02-23 PROCEDURE — 82962 GLUCOSE BLOOD TEST: CPT

## 2022-02-23 PROCEDURE — C1713: CPT

## 2022-02-23 PROCEDURE — 85025 COMPLETE CBC W/AUTO DIFF WBC: CPT

## 2022-02-23 PROCEDURE — 36415 COLL VENOUS BLD VENIPUNCTURE: CPT

## 2022-02-23 PROCEDURE — 88304 TISSUE EXAM BY PATHOLOGIST: CPT

## 2022-02-23 RX ORDER — CARVEDILOL PHOSPHATE 80 MG/1
1 CAPSULE, EXTENDED RELEASE ORAL
Qty: 0 | Refills: 0 | DISCHARGE
Start: 2022-02-23

## 2022-02-23 RX ORDER — METFORMIN HYDROCHLORIDE 850 MG/1
0 TABLET ORAL
Qty: 0 | Refills: 0 | DISCHARGE

## 2022-02-23 RX ORDER — METFORMIN HYDROCHLORIDE 850 MG/1
1 TABLET ORAL
Qty: 0 | Refills: 0 | DISCHARGE

## 2022-02-23 RX ORDER — SODIUM CHLORIDE 9 MG/ML
3 INJECTION INTRAMUSCULAR; INTRAVENOUS; SUBCUTANEOUS EVERY 8 HOURS
Refills: 0 | Status: DISCONTINUED | OUTPATIENT
Start: 2022-02-23 | End: 2022-02-23

## 2022-02-23 RX ORDER — ATORVASTATIN CALCIUM 80 MG/1
1 TABLET, FILM COATED ORAL
Qty: 0 | Refills: 0 | DISCHARGE

## 2022-02-23 RX ORDER — SACUBITRIL AND VALSARTAN 24; 26 MG/1; MG/1
0 TABLET, FILM COATED ORAL
Qty: 0 | Refills: 0 | DISCHARGE

## 2022-02-23 RX ORDER — FLUTICASONE PROPIONATE 50 MCG
0 SPRAY, SUSPENSION NASAL
Qty: 0 | Refills: 0 | DISCHARGE

## 2022-02-23 RX ORDER — AMLODIPINE BESYLATE 2.5 MG/1
1 TABLET ORAL
Qty: 0 | Refills: 0 | DISCHARGE
Start: 2022-02-23

## 2022-02-23 RX ORDER — OXYCODONE HYDROCHLORIDE 5 MG/1
10 TABLET ORAL EVERY 4 HOURS
Refills: 0 | Status: DISCONTINUED | OUTPATIENT
Start: 2022-02-23 | End: 2022-02-23

## 2022-02-23 RX ORDER — ZOLPIDEM TARTRATE 10 MG/1
0 TABLET ORAL
Qty: 0 | Refills: 0 | DISCHARGE

## 2022-02-23 RX ORDER — AMLODIPINE BESYLATE 2.5 MG/1
0 TABLET ORAL
Qty: 0 | Refills: 1 | DISCHARGE

## 2022-02-23 RX ORDER — FLUTICASONE PROPIONATE 50 MCG
0 SPRAY, SUSPENSION NASAL
Qty: 0 | Refills: 0 | DISCHARGE
Start: 2022-02-23

## 2022-02-23 RX ORDER — CARVEDILOL PHOSPHATE 80 MG/1
0 CAPSULE, EXTENDED RELEASE ORAL
Qty: 0 | Refills: 0 | DISCHARGE

## 2022-02-23 RX ORDER — OXYCODONE HYDROCHLORIDE 5 MG/1
1 TABLET ORAL
Qty: 30 | Refills: 0
Start: 2022-02-23 | End: 2022-02-27

## 2022-02-23 RX ORDER — ATORVASTATIN CALCIUM 80 MG/1
0 TABLET, FILM COATED ORAL
Qty: 0 | Refills: 0 | DISCHARGE

## 2022-02-23 RX ORDER — ATORVASTATIN CALCIUM 80 MG/1
1 TABLET, FILM COATED ORAL
Qty: 0 | Refills: 0 | DISCHARGE
Start: 2022-02-23

## 2022-02-23 RX ORDER — INDOMETHACIN 50 MG
0 CAPSULE ORAL
Qty: 0 | Refills: 0 | DISCHARGE

## 2022-02-23 RX ADMIN — SACUBITRIL AND VALSARTAN 1 TABLET(S): 24; 26 TABLET, FILM COATED ORAL at 06:06

## 2022-02-23 RX ADMIN — OXYCODONE HYDROCHLORIDE 5 MILLIGRAM(S): 5 TABLET ORAL at 06:07

## 2022-02-23 RX ADMIN — OXYCODONE HYDROCHLORIDE 5 MILLIGRAM(S): 5 TABLET ORAL at 00:41

## 2022-02-23 RX ADMIN — BENZOCAINE AND MENTHOL 1 LOZENGE: 5; 1 LIQUID ORAL at 06:06

## 2022-02-23 RX ADMIN — BENZOCAINE AND MENTHOL 1 LOZENGE: 5; 1 LIQUID ORAL at 00:41

## 2022-02-23 RX ADMIN — Medication 100 MILLIGRAM(S): at 06:06

## 2022-02-23 RX ADMIN — METHOCARBAMOL 500 MILLIGRAM(S): 500 TABLET, FILM COATED ORAL at 06:07

## 2022-02-23 RX ADMIN — OXYCODONE HYDROCHLORIDE 10 MILLIGRAM(S): 5 TABLET ORAL at 10:27

## 2022-02-23 RX ADMIN — METFORMIN HYDROCHLORIDE 250 MILLIGRAM(S): 850 TABLET ORAL at 06:06

## 2022-02-23 RX ADMIN — AMLODIPINE BESYLATE 2.5 MILLIGRAM(S): 2.5 TABLET ORAL at 06:07

## 2022-02-23 RX ADMIN — CARVEDILOL PHOSPHATE 3.12 MILLIGRAM(S): 80 CAPSULE, EXTENDED RELEASE ORAL at 06:07

## 2022-02-23 RX ADMIN — OXYCODONE HYDROCHLORIDE 5 MILLIGRAM(S): 5 TABLET ORAL at 07:15

## 2022-02-23 RX ADMIN — OXYCODONE HYDROCHLORIDE 5 MILLIGRAM(S): 5 TABLET ORAL at 01:20

## 2022-02-23 NOTE — DISCHARGE NOTE PROVIDER - NSDCMRMEDTOKEN_GEN_ALL_CORE_FT
amlodipine 2.5 mg tablet:   atorvastatin 40 mg tablet:   carvedilol 3.125 mg tablet:   Entresto 24 mg-26 mg tablet:   indomethacin 25 mg capsule:   metformin  mg tablet,extended release 24 hr:   oxyCODONE 5 mg oral tablet: 1 tab(s) orally every 4 hours, As needed, Moderate Pain (4 - 6)  2tabs orally every 6 hours for severe pain 6-10 MDD:4  paroxetine 20 mg tablet:

## 2022-02-23 NOTE — DISCHARGE NOTE PROVIDER - CARE PROVIDER_API CALL
Isaias Hunt (DO)  Neurosurgery  [facility] [place] [address]  Phone: [phone]  Fax: [phone]  Follow Up Time: 1 week

## 2022-02-23 NOTE — DISCHARGE NOTE NURSING/CASE MANAGEMENT/SOCIAL WORK - NSDCPEFALRISK_GEN_ALL_CORE
For information on Fall & Injury Prevention, visit: [email or web address] OR  [email or web address] OR  https://www.cdc.gov/steadi/patient.html

## 2022-02-23 NOTE — DISCHARGE NOTE PROVIDER - HOSPITAL COURSE
71 year old female present today for pst. She report persistent neck pain since her trip and fall in Feb 2021.  She report pain is worst when turning her head to the right . She report having imaging of head and neck and was told negative for bleed  noted degenerative disc disease and spinal stenosis.  Patient reports she was told spinal cord compression was also noted on imaging.  pt reports she has urinary incontinence at baseline. denies bowel dysfunction denies pain down the arm but report hand weakness and has dropped objects in past.   denies numbness or tingle down the arm. Denies any prior interventions or LORRAINE.  reports treating pain with OTC anti inflammatories. She is now schedule  for C4-C5, C5-C6, C6-C7, anterior cervical decompression and fusion. Pt underwent a ACDF on 2/22 which post operatively did very well. POD #1 pt complaining of slight throat soreness with swallowing a bagel.   Pt is doing well with softer texture substances.   PT ambulated with Physical therapy and did well. She is eager to be discharged.  Pt's incision site clean dry and nonbulging with dressing inplace.

## 2022-02-23 NOTE — DISCHARGE NOTE NURSING/CASE MANAGEMENT/SOCIAL WORK - PATIENT PORTAL LINK FT
You can access the FollowMyHealth Patient Portal offered by [facility] by registering at the following website: [email or web address]. By joining [facility]’s FollowMyHealth portal, you will also be able to view your health information using other applications (apps) compatible with our system.

## 2022-02-25 LAB — SURGICAL PATHOLOGY STUDY: SIGNIFICANT CHANGE UP

## 2022-07-28 NOTE — ASU PATIENT PROFILE, ADULT - NS PRO AD ANY ON CHART
-As a reminder, please remember to come 15 minutes early to your next cardiology appointment. We use this time to obtain necessary EKGs, go over medications, and obtain vital signs. This helps optimize your appointment, allowing for adequate time with ANGY Johnson or Dr. Eliud MD.      -Continue current treatment.    -Call for new/changing symptoms, questions, or concerns.    -Follow up with primary care in the future as planned.    -Return for follow up in 6 months.     No

## 2022-12-21 NOTE — ASU PATIENT PROFILE, ADULT - PRO ARRIVE FROM
CHIEF COMPLAINT:    Mr. Waite is a 78 y.o. male presenting with peyronie's.    PRESENTING ILLNESS:    Carolann Waite is a 78 y.o. male who c/o peyronie's disease and severe ED.  He's s/p placement of a 3 piece AMS IPP with modeling on 12/14/21.  His pump migrated cephalad somewhat and was painful for him.  On 9/8/22, he underwent repair of his IPP with relocation of the pump.  He presents today c/o penile pain with inflation, pain with intercourse, and insufficient size of the penis.  Says the pain can be a 7/10 in intensity when he attempts intercourse.    REVIEW OF SYSTEMS:    Carolann Waiet denies headache, blurred vision, fever, nausea, vomiting, chills, abdominal pain, chest pain, sore throat, bleeding per rectum, cough, SOB, recent loss of consciousness, recent mental status changes, seizures, dizziness, or upper or lower extremity weakness.    LUIS MANUEL  1. 1  2. 0  3. 1  4. 0  5. 0       PATIENT HISTORY:    Past Medical History:   Diagnosis Date    Arthritis     Cataract     OU    Colon polyps     Diabetes     Diabetes mellitus, type 2     Diverticulosis     Gout     History of colon polyps 10/8/2014    HTN (hypertension)     Hyperlipidemia LDL goal <100     Impotence     Melanoma     Left forearm, s/p excision    S/P CABG x 4     Squamous cell carcinoma     Head/nose       Past Surgical History:   Procedure Laterality Date    cancer of skin (nose)  02/2014    basal cell ca    CIRCUMCISION, PRIMARY      COLONOSCOPY  7/31/2009  Radhames    Normal colon and TI.    COLONOSCOPY  10/08/2014    Dr. Ricci, repeat in 6-7 years    COLONOSCOPY N/A 01/18/2019    Procedure: COLONOSCOPY;  Surgeon: Ron Ricci Jr., MD;  Location: [facility];  Service: Endoscopy;  Laterality: N/A;    COLONOSCOPY W/ POLYPECTOMY  8/7/2006  Radhames    Three cecal polyps, largest 4mm x 12mm.  TUBULAR ADENOMAS.  One 2 mm in the hepatic flexure.  TUBULAR ADENOMAS.    CORONARY ARTERY BYPASS GRAFT (CABG) N/A 3/20/2022    Procedure: CABG W/ LIMA X 4  VESSELS;  Surgeon: Catarino Banuelos MD;  Location: [facility] OR;  Service: Cardiovascular;  Laterality: N/A;    ENDOSCOPIC HARVEST OF VEIN Left 3/20/2022    Procedure: SURGICAL PROCUREMENT, VEIN, ENDOSCOPIC;  Surgeon: Catarino Banuelos MD;  Location: [facility] OR;  Service: Cardiovascular;  Laterality: Left;    EPIDURAL STEROID INJECTION INTO CERVICAL SPINE N/A 08/15/2018    Procedure: Injection-steroid-epidural-cervical;  Surgeon: Keny Ibanez MD;  Location: [facility] OR;  Service: Pain Management;  Laterality: N/A;  to left    FLEXIBLE SIGMOIDOSCOPY  ~[address] [place]    INSERTION OF INFLATABLE PENILE PROSTHESIS N/A 12/14/2021    Procedure: INSERTION, PENILE PROSTHESIS, INFLATABLE;  Surgeon: Juventino Rodriguez MD;  [facility];  Service: Urology;  Laterality: N/A;  1.5hr    LEFT HEART CATHETERIZATION N/A 3/15/2022    Procedure: Left heart cath;  Surgeon: Kulwinder Francis III, MD;  Location: [facility] CATH;  Service: Cardiology;  Laterality: N/A;    RECONSTRUCTION OF PENIS N/A 12/14/2021    Procedure: RECONSTRUCTION, PENIS remodeling;  Surgeon: Juventino Rodriguez MD;  Location: [facility] OR 2ND FLR;  Service: Urology;  Laterality: N/A;  1.5hr    removal of lipoma      R arm    removal of melanoma  2009    L arm    TONSILLECTOMY      UPPER GASTROINTESTINAL ENDOSCOPY  in his 30's    normal findings per patient report       Family History   Problem Relation Age of Onset    Diabetes Mother     Cancer Father         lymphoma    Hypertension Father     Nephrolithiasis Father     Heart disease Brother 56        mi    Cancer Brother         Prostate    Heart attack Brother     Colon cancer Neg Hx     Colon polyps Neg Hx     Crohn's disease Neg Hx     Ulcerative colitis Neg Hx     Stomach cancer Neg Hx     Esophageal cancer Neg Hx     Anesthesia problems Neg Hx        Social History     Socioeconomic History    Marital status:    Tobacco Use    Smoking status: Former     Types: Cigars     Quit date: 3/20/2022     Years  since quittin.7    Smokeless tobacco: Never    Tobacco comments:     smokes a cigar on occasion   Substance and Sexual Activity    Alcohol use: Not Currently     Comment: 1 drink per month    Drug use: No    Sexual activity: Yes     Partners: Female       Allergies:  Hydrocodone    Medications:    Current Outpatient Medications:     acetaminophen (TYLENOL) 325 MG tablet, Take 2 tablets (650 mg total) by mouth every 6 (six) hours as needed for Pain., Disp: , Rfl: 0    amLODIPine (NORVASC) 10 MG tablet, TAKE 1 TABLET EVERY DAY, Disp: 90 tablet, Rfl: 0    aspirin (ECOTRIN) 81 MG EC tablet, Take 81 mg by mouth once daily. Takes as a preventative, Disp: , Rfl:     atorvastatin (LIPITOR) 20 MG tablet, TAKE 1 TABLET EVERY DAY, Disp: 90 tablet, Rfl: 2    blood sugar diagnostic (ACCU-CHEK MILENA PLUS TEST STRP MISC), 1 strip by Misc.(Non-Drug; Combo Route) route 2 (two) times a day., Disp: , Rfl:     etodolac (LODINE) 200 MG Cap, Take 1 capsule (200 mg total) by mouth every 8 (eight) hours as needed. To use for gout attack related pain instead of indocin., Disp: 90 capsule, Rfl: 1    fexofenadine (ALLEGRA) 180 MG tablet, Take 180 mg by mouth daily as needed (post nasal drip)., Disp: , Rfl:     fluticasone (FLONASE) 50 mcg/actuation nasal spray, 2 sprays by Nasal route daily as needed. 1 - 2 Spray(s) Nasal daily., Disp: 16 g, Rfl: 2    Lactobacillus acidophilus (PROBIOTIC ORAL), Take 1 tablet by mouth every morning., Disp: , Rfl:     losartan (COZAAR) 100 MG tablet, Take 50 mg by mouth every morning., Disp: , Rfl:     mupirocin (BACTROBAN) 2 % ointment, Apply topically 2 (two) times daily as needed (sores)., Disp: , Rfl:     pantoprazole (PROTONIX) 40 MG tablet, TAKE 1 TABLET EVERY DAY, Disp: 90 tablet, Rfl: 0    polyethylene glycol (GLYCOLAX) 17 gram/dose powder, Mix 1 capful (17 g) with a liquid and take by mouth once daily., Disp: 238 g, Rfl: 0    RESTASIS 0.05 % ophthalmic emulsion, Place 1 drop into both eyes 2 (two)  times daily., Disp: , Rfl:     SITagliptin (JANUVIA) 100 MG Tab, Take 50 mg by mouth every morning., Disp: , Rfl:     tamsulosin (FLOMAX) 0.4 mg Cap, Take 1 capsule (0.4 mg total) by mouth once daily., Disp: 30 capsule, Rfl: 11    triamcinolone acetonide 0.1% (KENALOG) 0.1 % cream, Apply topically daily as needed (itch)., Disp: , Rfl:     vitamin D (VITAMIN D3) 1000 units Tab, Take 1,000 Units by mouth every morning., Disp: , Rfl:     diclofenac sodium (VOLTAREN) 1 % Gel, Apply 2 g topically 3 (three) times daily. Apply to affected area TID x 7 days then PRN for 7 days, Disp: 1 each, Rfl: 1    PHYSICAL EXAMINATION:    The patient generally appears in good health, is appropriately interactive, and is in no apparent distress.     Eyes: anicteric sclerae, moist conjunctivae; no lid-lag; PERRLA     HENT: Atraumatic; oropharynx clear with moist mucous membranes and no mucosal ulcerations;normal hard and soft palate.  No evidence of lymphadenopathy.    Neck: Trachea midline.  No thyromegaly.    Skin: No lesions.    Mental: Cooperative with normal affect.  Is oriented to time, place, and person.    Neuro: Grossly intact.    Chest: Normal inspiratory effort.   No accessory muscles.  No audible wheezes.  Respirations symmetric on inspiration and expiration.    Heart: Regular rhythm.      Abdomen:  Soft, non-tender. No masses or organomegaly. Bladder is not palpable. No evidence of flank discomfort. No evidence of inguinal hernia.    Genitourinary: The penis is circumcised with a plaque c/w peyronie's on the shaft. The urethral meatus is normal. The testes, epididymides, and cord structures are normal in size and contour bilaterally. The scrotum is normal in size and contour. The IPP components are palpable in the penis and scrotum.    Extremities: No clubbing, cyanosis, or edema      LABS:    UA dipped negative today  Lab Results   Component Value Date    PSA 2.6 08/23/2021    PSA 3.7 11/06/2017    PSA 1.6 05/17/2017     PSADIAG 2.1 08/22/2018       IMPRESSION:    Encounter Diagnoses   Name Primary?    Erectile dysfunction due to arterial insufficiency Yes    Peyronie's disease     DM type 2 causing complication     Primary hypertension     Hyperlipidemia LDL goal <70    HTN, stable  Hyperlipidemia, stable  DM, stable      PLAN:    1. Discussed options for his severe ED (affected by above comorbidities).  Discussed that the device is in good position.  I was able to cycle it appropriately today, and he tolerated it.  However, if he's having as much pain as he states, offered removal.  Discussed that this will result in penile shortening and ED.  He'd like to think about this.  2. He will let me know if he wants removal.  Otherwise, will continue to cycle.      Copy to:            home

## 2025-03-11 ENCOUNTER — INPATIENT (INPATIENT)
Facility: HOSPITAL | Age: 75
LOS: 2 days | Discharge: HOME | DRG: 536 | End: 2025-03-14
Attending: HOSPITALIST | Admitting: STUDENT IN AN ORGANIZED HEALTH CARE EDUCATION/TRAINING PROGRAM
Payer: MEDICARE

## 2025-03-11 VITALS
SYSTOLIC BLOOD PRESSURE: 137 MMHG | WEIGHT: 136.91 LBS | RESPIRATION RATE: 16 BRPM | HEIGHT: 62 IN | HEART RATE: 65 BPM | OXYGEN SATURATION: 96 % | TEMPERATURE: 98 F | DIASTOLIC BLOOD PRESSURE: 79 MMHG

## 2025-03-11 DIAGNOSIS — Z98.890 OTHER SPECIFIED POSTPROCEDURAL STATES: Chronic | ICD-10-CM

## 2025-03-11 DIAGNOSIS — S72.001A FRACTURE OF UNSPECIFIED PART OF NECK OF RIGHT FEMUR, INITIAL ENCOUNTER FOR CLOSED FRACTURE: ICD-10-CM

## 2025-03-11 LAB
ALBUMIN SERPL ELPH-MCNC: 4.1 G/DL — SIGNIFICANT CHANGE UP (ref 3.3–5.2)
ALP SERPL-CCNC: 133 U/L — HIGH (ref 40–120)
ALT FLD-CCNC: 12 U/L — SIGNIFICANT CHANGE UP
ANION GAP SERPL CALC-SCNC: 17 MMOL/L — SIGNIFICANT CHANGE UP (ref 5–17)
APTT BLD: 33.1 SEC — SIGNIFICANT CHANGE UP (ref 24.5–35.6)
AST SERPL-CCNC: 17 U/L — SIGNIFICANT CHANGE UP
BASOPHILS # BLD AUTO: 0.05 K/UL — SIGNIFICANT CHANGE UP (ref 0–0.2)
BASOPHILS NFR BLD AUTO: 0.4 % — SIGNIFICANT CHANGE UP (ref 0–2)
BILIRUB SERPL-MCNC: 0.4 MG/DL — SIGNIFICANT CHANGE UP (ref 0.4–2)
BLD GP AB SCN SERPL QL: SIGNIFICANT CHANGE UP
BUN SERPL-MCNC: 28.2 MG/DL — HIGH (ref 8–20)
CALCIUM SERPL-MCNC: 9.3 MG/DL — SIGNIFICANT CHANGE UP (ref 8.4–10.5)
CHLORIDE SERPL-SCNC: 105 MMOL/L — SIGNIFICANT CHANGE UP (ref 96–108)
CO2 SERPL-SCNC: 19 MMOL/L — LOW (ref 22–29)
CREAT SERPL-MCNC: 1.21 MG/DL — SIGNIFICANT CHANGE UP (ref 0.5–1.3)
EGFR: 46 ML/MIN/1.73M2 — LOW
EGFR: 46 ML/MIN/1.73M2 — LOW
EOSINOPHIL # BLD AUTO: 0.16 K/UL — SIGNIFICANT CHANGE UP (ref 0–0.5)
EOSINOPHIL NFR BLD AUTO: 1.2 % — SIGNIFICANT CHANGE UP (ref 0–6)
GLUCOSE SERPL-MCNC: 133 MG/DL — HIGH (ref 70–99)
HCT VFR BLD CALC: 38.4 % — SIGNIFICANT CHANGE UP (ref 34.5–45)
HGB BLD-MCNC: 12.9 G/DL — SIGNIFICANT CHANGE UP (ref 11.5–15.5)
IMM GRANULOCYTES # BLD AUTO: 0.08 K/UL — HIGH (ref 0–0.07)
IMM GRANULOCYTES NFR BLD AUTO: 0.6 % — SIGNIFICANT CHANGE UP (ref 0–0.9)
INR BLD: 0.97 RATIO — SIGNIFICANT CHANGE UP (ref 0.85–1.16)
LYMPHOCYTES # BLD AUTO: 2.09 K/UL — SIGNIFICANT CHANGE UP (ref 1–3.3)
LYMPHOCYTES NFR BLD AUTO: 15.2 % — SIGNIFICANT CHANGE UP (ref 13–44)
MCHC RBC-ENTMCNC: 31.8 PG — SIGNIFICANT CHANGE UP (ref 27–34)
MCHC RBC-ENTMCNC: 33.6 G/DL — SIGNIFICANT CHANGE UP (ref 32–36)
MCV RBC AUTO: 94.6 FL — SIGNIFICANT CHANGE UP (ref 80–100)
MONOCYTES # BLD AUTO: 0.64 K/UL — SIGNIFICANT CHANGE UP (ref 0–0.9)
MONOCYTES NFR BLD AUTO: 4.7 % — SIGNIFICANT CHANGE UP (ref 2–14)
MRSA PCR RESULT.: SIGNIFICANT CHANGE UP
NEUTROPHILS # BLD AUTO: 10.69 K/UL — HIGH (ref 1.8–7.4)
NEUTROPHILS NFR BLD AUTO: 77.9 % — HIGH (ref 43–77)
NRBC # BLD AUTO: 0 K/UL — SIGNIFICANT CHANGE UP (ref 0–0)
NRBC # FLD: 0 K/UL — SIGNIFICANT CHANGE UP (ref 0–0)
NRBC BLD AUTO-RTO: 0 /100 WBCS — SIGNIFICANT CHANGE UP (ref 0–0)
PLATELET # BLD AUTO: 250 K/UL — SIGNIFICANT CHANGE UP (ref 150–400)
PMV BLD: 9.8 FL — SIGNIFICANT CHANGE UP (ref 7–13)
POTASSIUM SERPL-MCNC: 5.2 MMOL/L — SIGNIFICANT CHANGE UP (ref 3.5–5.3)
POTASSIUM SERPL-SCNC: 5.2 MMOL/L — SIGNIFICANT CHANGE UP (ref 3.5–5.3)
PROT SERPL-MCNC: 7.4 G/DL — SIGNIFICANT CHANGE UP (ref 6.6–8.7)
PROTHROM AB SERPL-ACNC: 11.2 SEC — SIGNIFICANT CHANGE UP (ref 9.9–13.4)
RBC # BLD: 4.06 M/UL — SIGNIFICANT CHANGE UP (ref 3.8–5.2)
RBC # FLD: 12.5 % — SIGNIFICANT CHANGE UP (ref 10.3–14.5)
S AUREUS DNA NOSE QL NAA+PROBE: SIGNIFICANT CHANGE UP
SODIUM SERPL-SCNC: 141 MMOL/L — SIGNIFICANT CHANGE UP (ref 135–145)
WBC # BLD: 13.71 K/UL — HIGH (ref 3.8–10.5)
WBC # FLD AUTO: 13.71 K/UL — HIGH (ref 3.8–10.5)

## 2025-03-11 PROCEDURE — 99223 1ST HOSP IP/OBS HIGH 75: CPT

## 2025-03-11 PROCEDURE — 99285 EMERGENCY DEPT VISIT HI MDM: CPT

## 2025-03-11 PROCEDURE — 71045 X-RAY EXAM CHEST 1 VIEW: CPT | Mod: 26

## 2025-03-11 PROCEDURE — 72192 CT PELVIS W/O DYE: CPT | Mod: 26

## 2025-03-11 PROCEDURE — 73502 X-RAY EXAM HIP UNI 2-3 VIEWS: CPT | Mod: 26,RT

## 2025-03-11 PROCEDURE — 70450 CT HEAD/BRAIN W/O DYE: CPT | Mod: 26

## 2025-03-11 PROCEDURE — 93010 ELECTROCARDIOGRAM REPORT: CPT

## 2025-03-11 RX ORDER — MUPIROCIN CALCIUM 20 MG/G
1 CREAM TOPICAL
Refills: 0 | Status: DISCONTINUED | OUTPATIENT
Start: 2025-03-11 | End: 2025-03-12

## 2025-03-11 RX ORDER — OXYCODONE HYDROCHLORIDE 30 MG/1
5 TABLET ORAL
Refills: 0 | Status: DISCONTINUED | OUTPATIENT
Start: 2025-03-11 | End: 2025-03-12

## 2025-03-11 RX ORDER — IBUPROFEN 200 MG
400 TABLET ORAL EVERY 6 HOURS
Refills: 0 | Status: DISCONTINUED | OUTPATIENT
Start: 2025-03-11 | End: 2025-03-12

## 2025-03-11 RX ORDER — ACETAMINOPHEN 500 MG/5ML
975 LIQUID (ML) ORAL EVERY 8 HOURS
Refills: 0 | Status: DISCONTINUED | OUTPATIENT
Start: 2025-03-11 | End: 2025-03-12

## 2025-03-11 RX ORDER — ACETAMINOPHEN 500 MG/5ML
1000 LIQUID (ML) ORAL ONCE
Refills: 0 | Status: COMPLETED | OUTPATIENT
Start: 2025-03-11 | End: 2025-03-11

## 2025-03-11 RX ORDER — CEFAZOLIN SODIUM IN 0.9 % NACL 3 G/100 ML
2000 INTRAVENOUS SOLUTION, PIGGYBACK (ML) INTRAVENOUS ONCE
Refills: 0 | Status: DISCONTINUED | OUTPATIENT
Start: 2025-03-12 | End: 2025-03-12

## 2025-03-11 RX ORDER — POVIDONE-IODINE 7.5 %
1 SOLUTION, NON-ORAL TOPICAL ONCE
Refills: 0 | Status: COMPLETED | OUTPATIENT
Start: 2025-03-11 | End: 2025-03-12

## 2025-03-11 RX ORDER — TRANEXAMIC ACID 1000 MG/10
1000 AMPUL (ML) INTRAVENOUS ONCE
Refills: 0 | Status: COMPLETED | OUTPATIENT
Start: 2025-03-11 | End: 2025-03-11

## 2025-03-11 RX ORDER — VANCOMYCIN HCL IN 5 % DEXTROSE 1.5G/250ML
1000 PLASTIC BAG, INJECTION (ML) INTRAVENOUS ONCE
Refills: 0 | Status: DISCONTINUED | OUTPATIENT
Start: 2025-03-12 | End: 2025-03-12

## 2025-03-11 RX ADMIN — Medication 75 MILLILITER(S): at 22:15

## 2025-03-11 RX ADMIN — Medication 4 MILLIGRAM(S): at 20:28

## 2025-03-11 RX ADMIN — Medication 975 MILLIGRAM(S): at 22:14

## 2025-03-11 NOTE — H&P ADULT - NSHPPHYSICALEXAM_GEN_ALL_CORE
T(C): 36.6 (03-12-25 @ 00:14), Max: 36.8 (03-11-25 @ 19:33)  HR: 54 (03-12-25 @ 00:14) (54 - 65)  BP: 135/73 (03-12-25 @ 00:14) (123/74 - 137/79)  RR: 18 (03-12-25 @ 00:14) (16 - 18)  SpO2: 96% (03-12-25 @ 00:14) (96% - 98%)    GENERAL: patient appears well, no acute distress, appropriate, pleasant  EYES: sclera clear, no exudates  ENMT: oropharynx clear without erythema, no exudates, moist mucous membranes  NECK: supple, soft, no thyromegaly noted  LUNGS: good air entry bilaterally, clear to auscultation, symmetric breath sounds, no wheezing or rhonchi appreciated  HEART: soft S1/S2, regular rate and rhythm, no murmurs noted, no lower extremity edema  GASTROINTESTINAL: abdomen is soft, nontender, nondistended, normoactive bowel sounds, no palpable masses  INTEGUMENT: good skin turgor, warm skin, appears well perfused  MUSCULOSKELETAL: no clubbing or cyanosis, no obvious deformity  NEUROLOGIC: awake, alert, oriented x3, good muscle tone in 4 extremities, no obvious sensory deficits  PSYCHIATRIC: mood is good, affect is congruent, linear and logical thought process  HEME/LYMPH: no palpable supraclavicular nodules, no obvious ecchymosis or petechiae

## 2025-03-11 NOTE — H&P ADULT - ASSESSMENT
73yo F with PMHx DM, HTN presented to ED c/o right hip pain s/p mechanical fall.    Right proximal femoral fracture   -s/p mechanical fall   -CT with acute, comminuted, right proximal femoral fracture.  -Plan for MRI then OR with ortho in the am  -Pain control prn   -Keep NPO after midnight   -Pt able to perform METs >4 at baseline, no acute cp, sob, palpitations. EKG with known LBBB compare to EKG from 12/22/2021, without acute ischemia or arrhythmia. Labs reviewed pt is medically optimized for planned procedure.     HTN/HLD  -cont with amlodipine, coreg and lisinopril with parameter   -Atorvastatin 40mg po daily     DM  -Hold home metformin, Glipizide   -ISS, hypoglycemic protocol   -check A1c     Anxiety/depression   -Paroxetine 20mg po daily     DVT ppx  -hold AC for now, resume after surgery as per ortho recs   Dispo: active, pending surgery, likely dc to [facility] after medically stable       Time-based billing (NON-critical care).     77 minutes spent on total encounter. The necessity of the time spent during the encounter on this date of service was due to:   chart review, patient evaluation, independent history taking, documentation, coordination of care and discussion with patient, and nurse.

## 2025-03-11 NOTE — ED PROVIDER NOTE - CLINICAL SUMMARY MEDICAL DECISION MAKING FREE TEXT BOX
74-year-old female states she tripped over her dog and fell landed on her right hip complains of pain and she thinks it is broken.  No LOC.  History of hypertension and diabetes .    patient complains of severe pain in her right hip.  Vital signs are stable nontoxic-appearing in mild distress.  Neuro is nonfocal.  awake alert and oriented timesx 3.    Head normocephalic no evidence of trauma.  C-spine T-spine LS-spine nontender.  Lungs are clear.  Cardiac exam is within normal limits no murmurs rubs or gallops.  Abdomen is soft nontender.  Right hip is tender.  Unable to flex at the hip and knee.  Externally rotated.  Not shortened.  Left lower extremity full range of motion able to flex at the hip and knee.  Plan analgesia, preop labs, chest x-ray, EKG, consult Ortho.

## 2025-03-11 NOTE — ED PROVIDER NOTE - INTERNATIONAL TRAVEL
Stable POD 1  Decrease IVF  Advance diet  OOB with assistance  May remove Siegel later today when ambulating  Continue ViOptix
No

## 2025-03-11 NOTE — ED ADULT NURSE NOTE - NS ED NURSE RECORD ANOTHER VITAL SIGN
[FreeTextEntry1] : Sarai is a 60 yr old female, here for DM type 2 , PHPT, Has not been seen in 1 year,   ''Pt. reports A1C has been rising due to stress with . Her  almost lost his leg to diabetes. speaks about how she overeats when stressed out, reports that mother has some form of thyroid cancer, denies pancreatitis we continue to recommend parathyroid scan due to high calcium, states that she is taking b12 to help with calcium states that she is willing to accept fosamax for osteopenia .  Stopped Mounjaro due to price was in [facility] hole   denies being in the hospital or having any acute illness.  Quality: Type 2 DM Severity: uncontrolled Duration: over 5 years Onset: routine labs Modifying Factors: Worse with diet Family History: Mother - diabetes, thyroid cancer, Brother- diabetes    Current Regimen: Metformin 1000 mg 2 tabs daily Glipizide 5 mg BID Tresiba 18 units in pm  Mounjaro 5 mg weekly (stopped insurance will not pay)   Ozempic 0.5 mg weekly - discontinued due to symptoms, vomiting    Fingerstick in clinic: 180 fasting   a1c 6.3% in office   Self blood sugar monitorin times a day, per logs, scanned in  fastin-140s Befroe bed : 190-250s    exercise: home motion exercises    Weight: loss 13 pounds    Diet: no more fast food  B- corn muffin, cereal  L- cottage cheese, turkey, fruit  D- grilled chicken, vegetable, fruits  Snacks - sugar free cookies      Date of last eye exam:  (-)  Date of last foot exam: 3/2023 with podiatry, denies neuropathy, every 6 months Date of last flu vaccine:  Date of last Pneumovax: last couple of years Seen Cardiology - no heart disease  Has been having recurrent UTI for the past few months, following with urgology    Has h/o thyroid nodules, being monitored on US. Last US was in ,    Vitamin D: takes 5,000 units daily  Vitamin B12: takes 1000 units daily    PMH:  Retired  obesity  arthritis    PSH:  neck surgery due to herniated disc 2019  left breast cyst  left ovary cyst  
Yes

## 2025-03-11 NOTE — CONSULT NOTE ADULT - NS ATTEND AMEND GEN_ALL_CORE FT
Agree with PA note and plan as written - Patient with IT fracture and to continue closed treatment of right hip fracture.  Await operative fixation of right hip for optimal function and patient understands risks, benefits, alternatives and complications of procedure and awaits OR>

## 2025-03-11 NOTE — ED PROVIDER NOTE - OBJECTIVE STATEMENT
74-year-old female states she tripped over her dog and fell landed on her right hip complains of pain and she thinks it is broken.  No LOC.  History of hypertension and diabetes .

## 2025-03-11 NOTE — ED ADULT TRIAGE NOTE - TEMP AT ED ARRIVAL (C)
Patient requesting a refill of Naproxen, it was prescribed by the IC doctor on 1/3/20 for a knee injury. I have pended it to you. He is also requesting an MRI. We did refer him to ortho for his injury. Please advise, thanks. 36.4

## 2025-03-11 NOTE — H&P ADULT - HISTORY OF PRESENT ILLNESS
the evaluation and management of this patient commenced prior to midnight.      74y F with PMHx DM, HTN presenting to the emergency department with a chief complaint of right hip pain. Patient states that she was getting off her couch, went to step over her dog that was laying in front of her, lost balance and fell onto right side. the evaluation and management of this patient commenced prior to midnight.    75yo F with PMHx DM, HTN presented to ED c/o right hip pain s/p mechanical fall. Patient states that she was getting off her couch tripped over her dog and fell landed on her right sided. Pt reports that she is currently pain free but c/o nausea and had one episode of vomiting during encounter Denies any cp, sob, palpitations, fever, chills.

## 2025-03-11 NOTE — ED ADULT NURSE NOTE - NSFALLHARMRISKINTERV_ED_ALL_ED

## 2025-03-11 NOTE — H&P ADULT - IN ACCORDANCE WITH [place] STATE LAW, WE OFFER EVERY PATIENT A HEPATITIS C TEST. WOULD YOU LIKE TO BE TESTED TODAY?
"Anesthesia Post Evaluation    Patient: Justa Acosta    Procedure(s) Performed: Procedure(s) (LRB):  ERCP (N/A)  ULTRASOUND-ENDOSCOPIC-UPPER (N/A)    Final Anesthesia Type: general  Patient location during evaluation: PACU  Patient participation: Yes- Able to Participate  Level of consciousness: awake and alert  Post-procedure vital signs: reviewed and stable  Pain management: adequate  Airway patency: patent  PONV status at discharge: No PONV  Anesthetic complications: no      Cardiovascular status: hypertensive (secondary to not taking home bp medications. Resumed in PACU. )  Respiratory status: unassisted  Hydration status: euvolemic  Follow-up not needed.        Visit Vitals  BP (!) 160/69   Pulse 67   Temp 36.6 °C (97.9 °F) (Oral)   Resp 16   Ht 5' 2" (1.575 m)   Wt 82.6 kg (182 lb)   SpO2 100%   BMI 33.29 kg/m²       Pain/Bridgette Score: Pain Assessment Performed: Yes (12/8/2017  2:30 PM)  Presence of Pain: complains of pain/discomfort (12/8/2017  2:30 PM)  Pain Rating Prior to Med Admin: 6 (12/8/2017  2:30 PM)  Bridgette Score: 8 (12/8/2017  2:30 PM)      "
"Anesthesia Post Evaluation    Patient: Justa Acosta    Procedure(s) Performed: Procedure(s) (LRB):  ERCP (N/A)  ULTRASOUND-ENDOSCOPIC-UPPER (N/A)    Final Anesthesia Type: general  Patient location during evaluation: [facility]  Patient participation: Yes- Able to Participate  Level of consciousness: awake and alert  Post-procedure vital signs: reviewed and stable  Pain management: adequate  Airway patency: patent  PONV status at discharge: No PONV  Anesthetic complications: no      Cardiovascular status: blood pressure returned to baseline  Respiratory status: unassisted, spontaneous ventilation and room air  Hydration status: euvolemic  Follow-up not needed.        Visit Vitals  BP (!) 172/74 (BP Location: Right arm, Patient Position: Lying)   Pulse 70   Temp 36.6 °C (97.9 °F) (Axillary)   Resp 12   Ht 5' 2" (1.575 m)   Wt 82.6 kg (182 lb)   SpO2 99%   BMI 33.29 kg/m²       Pain/Bridgette Score: Pain Assessment Performed: Yes (12/8/2017  1:30 PM)  Presence of Pain: non-verbal indicators absent (12/8/2017  1:30 PM)  Pain Rating Prior to Med Admin: 6 (12/8/2017  2:30 PM)  Bridgette Score: 8 (12/8/2017  1:30 PM)      "
Opt out

## 2025-03-11 NOTE — CONSULT NOTE ADULT - SUBJECTIVE AND OBJECTIVE BOX
Pt Name: EZEKIEL MCKEON    MRN: [id number]    Patient is a 74y Female PMH: DM, HTN presenting to the emergency department with a chief complaint of right hip pain. Patient states that she was getting off her couch, went to step over her dog that was laying in front of her, lost balance and fell onto right side. Normally ambulates without assist at baseline. Denies headstrike / LOC, Denies numbness / tingling to RLE. No other orthopedic complaints at this time.     REVIEW OF SYSTEMS    General: Alert, responsive, in NAD    Skin/Breast: No rashes, no pruritis     Musculoskeletal: SEE HPI.    Neurological: No sensory or motor changes.     PAST MEDICAL & SURGICAL HISTORY:  Diabetes mellitus    Anxiety    Left bundle branch block    HLD (hyperlipidemia)    H/O rotator cuff tear    HTN (hypertension)    H/O foot surgery    S/P shoulder surgery    S/p bilateral carpal tunnel release    S/P transposition of nerve    Allergies: No Known Allergies    Medications: acetaminophen     Tablet .. 975 milliGRAM(s) Oral every 8 hours  acetaminophen   IVPB .. 1000 milliGRAM(s) IV Intermittent Once  chlorhexidine 2% Cloths 1 Application(s) Topical <User Schedule>  ibuprofen  Tablet. 400 milliGRAM(s) Oral every 6 hours  mupirocin 2% Ointment 1 Application(s) Both Nostrils two times a day  oxyCODONE    IR 5 milliGRAM(s) Oral every 3 hours PRN  povidone iodine 10% Nasal Swab 1 Application(s) Both Nostrils once  sodium chloride 0.9% Bolus 500 milliLiter(s) IV Bolus once  sodium chloride 0.9%. 1000 milliLiter(s) IV Continuous <Continuous>  tranexamic acid IVPB 1000 milliGRAM(s) IV Intermittent Once    FAMILY HISTORY:  FH: HTN (hypertension) (Mother)    FH: dementia (Mother)    FH: spinal stenosis (Sibling)    : non-contributory    Social History:                 12.9   13.71 )-----------( 250      ( 11 Mar 2025 18:20 )             38.4   03-11    141  |  105  |  28.2[H]  ----------------------------<  133[H]  5.2   |  19.0[L]  |  1.21    Ca    9.3      11 Mar 2025 18:20    TPro  7.4  /  Alb  4.1  /  TBili  0.4  /  DBili  x   /  AST  17  /  ALT  12  /  AlkPhos  133[H]  03-11    Vital Signs Last 24 Hrs  T(C): 36.8 (11 Mar 2025 19:33), Max: 36.8 (11 Mar 2025 19:33)  T(F): 98.2 (11 Mar 2025 19:33), Max: 98.2 (11 Mar 2025 19:33)  HR: 60 (11 Mar 2025 19:33) (60 - 65)  BP: 123/74 (11 Mar 2025 19:33) (123/74 - 137/79)  BP(mean): --  RR: 18 (11 Mar 2025 19:33) (16 - 18)  SpO2: 98% (11 Mar 2025 19:33) (96% - 98%)    Parameters below as of 11 Mar 2025 19:33  Patient On (Oxygen Delivery Method): room air    Daily Height in cm: 157.48 (11 Mar 2025 16:29)    Daily     PHYSICAL EXAM:    Appearance: Alert, responsive, in no acute distress.    Neurological: Sensation is grossly intact to light touch. No focal deficits or weaknesses found.    Skin: no rash on visible skin. Skin is clean, dry and intact. No bleeding. No abrasions. No ulcerations.    Vascular: 2+ distal pulses. Cap refill < 2 sec. No signs of venous insufficiency or stasis. No extremity ulcerations. No cyanosis.    Musculoskeletal:         Left Upper Extremity: Moving extremity freely without obvious signs of acute injury        Right Upper Extremity: Moving extremity freely without obvious signs of acute injury        Left Lower Extremity: Hip: NTTP, FROM. HF intact. Knee: NTTP, FROM. KE/KF intact. Ankle: NTTP, FROM. DF/PF/EHL/FHL intact. Compartments soft compressible. Sensation intact to light touch.        Right Lower Extremity: Hip: mild TTP, leg length not shortened compared to right side, unable to flex hip, unable to SLR, significant pain with log roll. Knee: NTTP, limited ROM 2/2 to hip pain, Ankle: NTTP, FROM. DF/PF/EHL/FHL intact. Compartments soft compressible. Sensation intact to light touch. +DP pulse    Imaging Studies:  < from: CT Pelvis Bony Only No Cont (03.11.25 @ 20:03) >    ACC: [id number] EXAM:  CT PELVIS BONY ONLY   ORDERED BY: YOSSI FLEMING     PROCEDURE DATE:  03/11/2025          INTERPRETATION:  Clinical information: Fall with right hip pain.    Comparison: None available.    Technique:  CT scan of the pelvis.  No intravenous contrast was administered.  3D reconstructions were created.    Findings:    There is an acute, comminuted, right proximal femoral fracture which   begins anterior laterally at the femoral neck (3, 98) and involves the   greatertrochanter. The fracture lines extend anteriorly just medial to   the base of the lesser trochanter.    There is mild left hip arthrosis. There is marked pubic symphysis   arthrosis. There is bilateral sacroiliac arthrosis. There is a partially   imaged dextroscoliosis of the lower lumbar spine with degenerative   changes.    Arterial calcification is noted. Rectum is distended with air.    Impression: Acute, comminuted, right proximal femoral fracture.    --- End of Report ---    SRINATH MARTINEZ MD; Attending Radiologist  This document has been electronically signed. Mar 11 2025  8:20PM    < end of copied text >      A/P:  Pt is a  74y Female found to have right hip fracture greater troch vs possible femoral neck unclear from CT images     PLAN:   * NPO for now   * MRI right hip ordered to better identify fracture pattern and need for surgery   * Plan to be finalized upon completion of imaging   * Case, plan, images discussed with Dr. Anton sharp attending Pt Name: EZEKIEL MCKEON    MRN: [id number]    Patient is a 74y Female PMH: DM, HTN presenting to the emergency department with a chief complaint of right hip pain. Patient states that she was getting off her couch, went to step over her dog that was laying in front of her, lost balance and fell onto right side. Normally ambulates without assist at baseline. Denies headstrike / LOC, Denies numbness / tingling to RLE. No other orthopedic complaints at this time.     REVIEW OF SYSTEMS    General: Alert, responsive, in NAD    Skin/Breast: No rashes, no pruritis     Musculoskeletal: SEE HPI.    Neurological: No sensory or motor changes.     PAST MEDICAL & SURGICAL HISTORY:  Diabetes mellitus    Anxiety    Left bundle branch block    HLD (hyperlipidemia)    H/O rotator cuff tear    HTN (hypertension)    H/O foot surgery    S/P shoulder surgery    S/p bilateral carpal tunnel release    S/P transposition of nerve    Allergies: No Known Allergies    Medications: acetaminophen     Tablet .. 975 milliGRAM(s) Oral every 8 hours  acetaminophen   IVPB .. 1000 milliGRAM(s) IV Intermittent Once  chlorhexidine 2% Cloths 1 Application(s) Topical <User Schedule>  ibuprofen  Tablet. 400 milliGRAM(s) Oral every 6 hours  mupirocin 2% Ointment 1 Application(s) Both Nostrils two times a day  oxyCODONE    IR 5 milliGRAM(s) Oral every 3 hours PRN  povidone iodine 10% Nasal Swab 1 Application(s) Both Nostrils once  sodium chloride 0.9% Bolus 500 milliLiter(s) IV Bolus once  sodium chloride 0.9%. 1000 milliLiter(s) IV Continuous <Continuous>  tranexamic acid IVPB 1000 milliGRAM(s) IV Intermittent Once    FAMILY HISTORY:  FH: HTN (hypertension) (Mother)    FH: dementia (Mother)    FH: spinal stenosis (Sibling)    : non-contributory    Social History:                 12.9   13.71 )-----------( 250      ( 11 Mar 2025 18:20 )             38.4   03-11    141  |  105  |  28.2[H]  ----------------------------<  133[H]  5.2   |  19.0[L]  |  1.21    Ca    9.3      11 Mar 2025 18:20    TPro  7.4  /  Alb  4.1  /  TBili  0.4  /  DBili  x   /  AST  17  /  ALT  12  /  AlkPhos  133[H]  03-11    Vital Signs Last 24 Hrs  T(C): 36.8 (11 Mar 2025 19:33), Max: 36.8 (11 Mar 2025 19:33)  T(F): 98.2 (11 Mar 2025 19:33), Max: 98.2 (11 Mar 2025 19:33)  HR: 60 (11 Mar 2025 19:33) (60 - 65)  BP: 123/74 (11 Mar 2025 19:33) (123/74 - 137/79)  BP(mean): --  RR: 18 (11 Mar 2025 19:33) (16 - 18)  SpO2: 98% (11 Mar 2025 19:33) (96% - 98%)    Parameters below as of 11 Mar 2025 19:33  Patient On (Oxygen Delivery Method): room air    Daily Height in cm: 157.48 (11 Mar 2025 16:29)    Daily     PHYSICAL EXAM:    Appearance: Alert, responsive, in no acute distress.    Neurological: Sensation is grossly intact to light touch. No focal deficits or weaknesses found.    Skin: no rash on visible skin. Skin is clean, dry and intact. No bleeding. No abrasions. No ulcerations.    Vascular: 2+ distal pulses. Cap refill < 2 sec. No signs of venous insufficiency or stasis. No extremity ulcerations. No cyanosis.    Musculoskeletal:         Left Upper Extremity: Moving extremity freely without obvious signs of acute injury        Right Upper Extremity: Moving extremity freely without obvious signs of acute injury        Left Lower Extremity: Hip: NTTP, FROM. HF intact. Knee: NTTP, FROM. KE/KF intact. Ankle: NTTP, FROM. DF/PF/EHL/FHL intact. Compartments soft compressible. Sensation intact to light touch.        Right Lower Extremity: Hip: mild TTP, leg length not shortened compared to right side, unable to flex hip, unable to SLR, significant pain with log roll. Knee: NTTP, limited ROM 2/2 to hip pain, Ankle: NTTP, FROM. DF/PF/EHL/FHL intact. Compartments soft compressible. Sensation intact to light touch. +DP pulse    Imaging Studies:  < from: CT Pelvis Bony Only No Cont (03.11.25 @ 20:03) >    ACC: [id number] EXAM:  CT PELVIS BONY ONLY   ORDERED BY: YOSSI FLEMING     PROCEDURE DATE:  03/11/2025          INTERPRETATION:  Clinical information: Fall with right hip pain.    Comparison: None available.    Technique:  CT scan of the pelvis.  No intravenous contrast was administered.  3D reconstructions were created.    Findings:    There is an acute, comminuted, right proximal femoral fracture which   begins anterior laterally at the femoral neck (3, 98) and involves the   greatertrochanter. The fracture lines extend anteriorly just medial to   the base of the lesser trochanter.    There is mild left hip arthrosis. There is marked pubic symphysis   arthrosis. There is bilateral sacroiliac arthrosis. There is a partially   imaged dextroscoliosis of the lower lumbar spine with degenerative   changes.    Arterial calcification is noted. Rectum is distended with air.    Impression: Acute, comminuted, right proximal femoral fracture.    --- End of Report ---    SRINATH MARTINEZ MD; Attending Radiologist  This document has been electronically signed. Mar 11 2025  8:20PM    < end of copied text >      A/P:  Pt is a  74y Female found to have right hip fracture greater troch vs possible femoral neck unclear from CT images     PLAN:   * NPO for now   * bedrest for now  * MRI right hip ordered to better identify fracture pattern and need for surgery   * Plan to be finalized upon completion of imaging   * Case, plan, images discussed with Dr. Anton sharp attending Pt Name: EZEKIEL MCKEON    MRN: [id number]    Patient is a 74y Female PMH: DM, HTN presenting to the emergency department with a chief complaint of right hip pain. Patient states that she was getting off her couch, went to step over her dog that was laying in front of her, lost balance and fell onto right side. Normally ambulates without assist at baseline. Denies headstrike / LOC, Denies numbness / tingling to RLE. No other orthopedic complaints at this time.     REVIEW OF SYSTEMS    General: Alert, responsive, in NAD    Skin/Breast: No rashes, no pruritis     Musculoskeletal: SEE HPI.    Neurological: No sensory or motor changes.     PAST MEDICAL & SURGICAL HISTORY:  Diabetes mellitus    Anxiety    Left bundle branch block    HLD (hyperlipidemia)    H/O rotator cuff tear    HTN (hypertension)    H/O foot surgery    S/P shoulder surgery    S/p bilateral carpal tunnel release    S/P transposition of nerve    Allergies: No Known Allergies    Medications: acetaminophen     Tablet .. 975 milliGRAM(s) Oral every 8 hours  acetaminophen   IVPB .. 1000 milliGRAM(s) IV Intermittent Once  chlorhexidine 2% Cloths 1 Application(s) Topical <User Schedule>  ibuprofen  Tablet. 400 milliGRAM(s) Oral every 6 hours  mupirocin 2% Ointment 1 Application(s) Both Nostrils two times a day  oxyCODONE    IR 5 milliGRAM(s) Oral every 3 hours PRN  povidone iodine 10% Nasal Swab 1 Application(s) Both Nostrils once  sodium chloride 0.9% Bolus 500 milliLiter(s) IV Bolus once  sodium chloride 0.9%. 1000 milliLiter(s) IV Continuous <Continuous>  tranexamic acid IVPB 1000 milliGRAM(s) IV Intermittent Once    FAMILY HISTORY:  FH: HTN (hypertension) (Mother)    FH: dementia (Mother)    FH: spinal stenosis (Sibling)    : non-contributory    Social History:                 12.9   13.71 )-----------( 250      ( 11 Mar 2025 18:20 )             38.4   03-11    141  |  105  |  28.2[H]  ----------------------------<  133[H]  5.2   |  19.0[L]  |  1.21    Ca    9.3      11 Mar 2025 18:20    TPro  7.4  /  Alb  4.1  /  TBili  0.4  /  DBili  x   /  AST  17  /  ALT  12  /  AlkPhos  133[H]  03-11    Vital Signs Last 24 Hrs  T(C): 36.8 (11 Mar 2025 19:33), Max: 36.8 (11 Mar 2025 19:33)  T(F): 98.2 (11 Mar 2025 19:33), Max: 98.2 (11 Mar 2025 19:33)  HR: 60 (11 Mar 2025 19:33) (60 - 65)  BP: 123/74 (11 Mar 2025 19:33) (123/74 - 137/79)  BP(mean): --  RR: 18 (11 Mar 2025 19:33) (16 - 18)  SpO2: 98% (11 Mar 2025 19:33) (96% - 98%)    Parameters below as of 11 Mar 2025 19:33  Patient On (Oxygen Delivery Method): room air    Daily Height in cm: 157.48 (11 Mar 2025 16:29)    Daily     PHYSICAL EXAM:    Appearance: Alert, responsive, in no acute distress.    Neurological: Sensation is grossly intact to light touch. No focal deficits or weaknesses found.    Skin: no rash on visible skin. Skin is clean, dry and intact. No bleeding. No abrasions. No ulcerations.    Vascular: 2+ distal pulses. Cap refill < 2 sec. No signs of venous insufficiency or stasis. No extremity ulcerations. No cyanosis.    Musculoskeletal:         Left Upper Extremity: Moving extremity freely without obvious signs of acute injury        Right Upper Extremity: Moving extremity freely without obvious signs of acute injury        Left Lower Extremity: Hip: NTTP, FROM. HF intact. Knee: NTTP, FROM. KE/KF intact. Ankle: NTTP, FROM. DF/PF/EHL/FHL intact. Compartments soft compressible. Sensation intact to light touch.        Right Lower Extremity: Hip: mild TTP, leg length not shortened compared to right side, unable to flex hip, unable to SLR, significant pain with log roll. Knee: NTTP, limited ROM 2/2 to hip pain, Ankle: NTTP, FROM. DF/PF/EHL/FHL intact. Compartments soft compressible. Sensation intact to light touch. +DP pulse    Imaging Studies:  < from: CT Pelvis Bony Only No Cont (03.11.25 @ 20:03) >    ACC: [id number] EXAM:  CT PELVIS BONY ONLY   ORDERED BY: SY NICOLE FLEMING     PROCEDURE DATE:  03/11/2025          INTERPRETATION:  Clinical information: Fall with right hip pain.    Comparison: None available.    Technique:  CT scan of the pelvis.  No intravenous contrast was administered.  3D reconstructions were created.    Findings:    There is an acute, comminuted, right proximal femoral fracture which   begins anterior laterally at the femoral neck (3, 98) and involves the   greatertrochanter. The fracture lines extend anteriorly just medial to   the base of the lesser trochanter.    There is mild left hip arthrosis. There is marked pubic symphysis   arthrosis. There is bilateral sacroiliac arthrosis. There is a partially   imaged dextroscoliosis of the lower lumbar spine with degenerative   changes.    Arterial calcification is noted. Rectum is distended with air.    Impression: Acute, comminuted, right proximal femoral fracture.    --- End of Report ---    SRINATH MARTINEZ MD; Attending Radiologist  This document has been electronically signed. Mar 11 2025  8:20PM    < end of copied text >      A/P:  Pt is a  74y Female found to have right hip fracture greater troch vs possible femoral neck unclear from CT images     PLAN:   * NPO for now   * bedrest for now  * hold anticoagulation for now  * MRI right hip ordered to better identify fracture pattern and need for surgery   * Plan to be finalized upon completion of imaging   * Case, plan, images discussed with Dr. Anton sharp attending Pt Name: EZEKIEL MCKEON    MRN: [id number]    Patient is a 74y Female PMH: DM, HTN presenting to the emergency department with a chief complaint of right hip pain. Patient states that she was getting off her couch, went to step over her dog that was laying in front of her, lost balance and fell onto right side. Normally ambulates without assist at baseline. Denies headstrike / LOC, Denies numbness / tingling to RLE. No other orthopedic complaints at this time.     REVIEW OF SYSTEMS    General: Alert, responsive, in NAD    Skin/Breast: No rashes, no pruritis     Musculoskeletal: SEE HPI.    Neurological: No sensory or motor changes.     PAST MEDICAL & SURGICAL HISTORY:  Diabetes mellitus    Anxiety    Left bundle branch block    HLD (hyperlipidemia)    H/O rotator cuff tear    HTN (hypertension)    H/O foot surgery    S/P shoulder surgery    S/p bilateral carpal tunnel release    S/P transposition of nerve    Allergies: No Known Allergies    Medications: acetaminophen     Tablet .. 975 milliGRAM(s) Oral every 8 hours  acetaminophen   IVPB .. 1000 milliGRAM(s) IV Intermittent Once  chlorhexidine 2% Cloths 1 Application(s) Topical <User Schedule>  ibuprofen  Tablet. 400 milliGRAM(s) Oral every 6 hours  mupirocin 2% Ointment 1 Application(s) Both Nostrils two times a day  oxyCODONE    IR 5 milliGRAM(s) Oral every 3 hours PRN  povidone iodine 10% Nasal Swab 1 Application(s) Both Nostrils once  sodium chloride 0.9% Bolus 500 milliLiter(s) IV Bolus once  sodium chloride 0.9%. 1000 milliLiter(s) IV Continuous <Continuous>  tranexamic acid IVPB 1000 milliGRAM(s) IV Intermittent Once    FAMILY HISTORY:  FH: HTN (hypertension) (Mother)    FH: dementia (Mother)    FH: spinal stenosis (Sibling)    : non-contributory    Social History:                 12.9   13.71 )-----------( 250      ( 11 Mar 2025 18:20 )             38.4   03-11    141  |  105  |  28.2[H]  ----------------------------<  133[H]  5.2   |  19.0[L]  |  1.21    Ca    9.3      11 Mar 2025 18:20    TPro  7.4  /  Alb  4.1  /  TBili  0.4  /  DBili  x   /  AST  17  /  ALT  12  /  AlkPhos  133[H]  03-11    Vital Signs Last 24 Hrs  T(C): 36.8 (11 Mar 2025 19:33), Max: 36.8 (11 Mar 2025 19:33)  T(F): 98.2 (11 Mar 2025 19:33), Max: 98.2 (11 Mar 2025 19:33)  HR: 60 (11 Mar 2025 19:33) (60 - 65)  BP: 123/74 (11 Mar 2025 19:33) (123/74 - 137/79)  BP(mean): --  RR: 18 (11 Mar 2025 19:33) (16 - 18)  SpO2: 98% (11 Mar 2025 19:33) (96% - 98%)    Parameters below as of 11 Mar 2025 19:33  Patient On (Oxygen Delivery Method): room air    Daily Height in cm: 157.48 (11 Mar 2025 16:29)    Daily     PHYSICAL EXAM:    Appearance: Alert, responsive, in no acute distress.    Neurological: Sensation is grossly intact to light touch. No focal deficits or weaknesses found.    Skin: no rash on visible skin. Skin is clean, dry and intact. No bleeding. No abrasions. No ulcerations.    Vascular: 2+ distal pulses. Cap refill < 2 sec. No signs of venous insufficiency or stasis. No extremity ulcerations. No cyanosis.    Musculoskeletal:         Left Upper Extremity: Moving extremity freely without obvious signs of acute injury        Right Upper Extremity: Moving extremity freely without obvious signs of acute injury        Left Lower Extremity: Hip: NTTP, FROM. HF intact. Knee: NTTP, FROM. KE/KF intact. Ankle: NTTP, FROM. DF/PF/EHL/FHL intact. Compartments soft compressible. Sensation intact to light touch.        Right Lower Extremity: Hip: mild TTP, leg length not shortened compared to right side, unable to flex hip, unable to SLR, significant pain with log roll. Knee: NTTP, limited ROM 2/2 to hip pain, Ankle: NTTP, FROM. DF/PF/EHL/FHL intact. Compartments soft compressible. Sensation intact to light touch. +DP pulse    Imaging Studies:  < from: CT Pelvis Bony Only No Cont (03.11.25 @ 20:03) >    ACC: [id number] EXAM:  CT PELVIS BONY ONLY   ORDERED BY: SY NICOLEANGELINA FLEMING     PROCEDURE DATE:  03/11/2025          INTERPRETATION:  Clinical information: Fall with right hip pain.    Comparison: None available.    Technique:  CT scan of the pelvis.  No intravenous contrast was administered.  3D reconstructions were created.    Findings:    There is an acute, comminuted, right proximal femoral fracture which   begins anterior laterally at the femoral neck (3, 98) and involves the   greatertrochanter. The fracture lines extend anteriorly just medial to   the base of the lesser trochanter.    There is mild left hip arthrosis. There is marked pubic symphysis   arthrosis. There is bilateral sacroiliac arthrosis. There is a partially   imaged dextroscoliosis of the lower lumbar spine with degenerative   changes.    Arterial calcification is noted. Rectum is distended with air.    Impression: Acute, comminuted, right proximal femoral fracture.    --- End of Report ---    SRINATH MARTINEZ MD; Attending Radiologist  This document has been electronically signed. Mar 11 2025  8:20PM    < end of copied text >    A/P:  Pt is a  74y Female found to have right hip fracture greater troch vs possible femoral neck unclear from CT images     PLAN:   * NPO for now, likley OR in am   * Fluids while NPO  * bedrest for now  * hold anticoagulation for now  * MRI right hip ordered to better identify fracture pattern and definitive need for surgery   * Plan to be finalized upon completion of imaging   * Case, plan, images discussed with Dr. Anton sharp attending

## 2025-03-11 NOTE — ED PROVIDER NOTE - CROS ED CARDIOVAS ALL NEG
Patient with recurrent UTIs recently started on daily prophylactic Macrobid.  Awaiting final culture and sensitivity. negative...

## 2025-03-11 NOTE — PRE-ANESTHESIA EVALUATION ADULT - NSANTHADDINFOFT_GEN_ALL_CORE
At time of review patient has no absolute contraindications for the proposed procedure from an anesthetic perspective.

## 2025-03-11 NOTE — ED ADULT NURSE NOTE - OBJECTIVE STATEMENT
pt presents to the ED s/p trip and fall. pt states she tripped over her dog, fell and landed on her R hip. pt denies LOC /headstrike.

## 2025-03-12 ENCOUNTER — TRANSCRIPTION ENCOUNTER (OUTPATIENT)
Age: 75
End: 2025-03-12

## 2025-03-12 LAB
ANION GAP SERPL CALC-SCNC: 14 MMOL/L — SIGNIFICANT CHANGE UP (ref 5–17)
APPEARANCE UR: CLEAR — SIGNIFICANT CHANGE UP
BACTERIA # UR AUTO: ABNORMAL /HPF
BILIRUB UR-MCNC: NEGATIVE — SIGNIFICANT CHANGE UP
BUN SERPL-MCNC: 30.8 MG/DL — HIGH (ref 8–20)
CALCIUM SERPL-MCNC: 9.1 MG/DL — SIGNIFICANT CHANGE UP (ref 8.4–10.5)
CAST: 4 /LPF — SIGNIFICANT CHANGE UP (ref 0–4)
CHLORIDE SERPL-SCNC: 105 MMOL/L — SIGNIFICANT CHANGE UP (ref 96–108)
CO2 SERPL-SCNC: 20 MMOL/L — LOW (ref 22–29)
COLOR SPEC: YELLOW — SIGNIFICANT CHANGE UP
CREAT SERPL-MCNC: 1.35 MG/DL — HIGH (ref 0.5–1.3)
DIFF PNL FLD: NEGATIVE — SIGNIFICANT CHANGE UP
EGFR: 41 ML/MIN/1.73M2 — LOW
EGFR: 41 ML/MIN/1.73M2 — LOW
GLUCOSE BLDC GLUCOMTR-MCNC: 162 MG/DL — HIGH (ref 70–99)
GLUCOSE BLDC GLUCOMTR-MCNC: 167 MG/DL — HIGH (ref 70–99)
GLUCOSE BLDC GLUCOMTR-MCNC: 175 MG/DL — HIGH (ref 70–99)
GLUCOSE BLDC GLUCOMTR-MCNC: 175 MG/DL — HIGH (ref 70–99)
GLUCOSE BLDC GLUCOMTR-MCNC: 213 MG/DL — HIGH (ref 70–99)
GLUCOSE BLDC GLUCOMTR-MCNC: 240 MG/DL — HIGH (ref 70–99)
GLUCOSE SERPL-MCNC: 166 MG/DL — HIGH (ref 70–99)
GLUCOSE UR QL: 100 MG/DL
HCT VFR BLD CALC: 36.4 % — SIGNIFICANT CHANGE UP (ref 34.5–45)
HGB BLD-MCNC: 12.2 G/DL — SIGNIFICANT CHANGE UP (ref 11.5–15.5)
KETONES UR-MCNC: ABNORMAL MG/DL
LEUKOCYTE ESTERASE UR-ACNC: ABNORMAL
MCHC RBC-ENTMCNC: 31.4 PG — SIGNIFICANT CHANGE UP (ref 27–34)
MCHC RBC-ENTMCNC: 33.5 G/DL — SIGNIFICANT CHANGE UP (ref 32–36)
MCV RBC AUTO: 93.6 FL — SIGNIFICANT CHANGE UP (ref 80–100)
NITRITE UR-MCNC: NEGATIVE — SIGNIFICANT CHANGE UP
NRBC # BLD AUTO: 0 K/UL — SIGNIFICANT CHANGE UP (ref 0–0)
NRBC # FLD: 0 K/UL — SIGNIFICANT CHANGE UP (ref 0–0)
NRBC BLD AUTO-RTO: 0 /100 WBCS — SIGNIFICANT CHANGE UP (ref 0–0)
PH UR: 5.5 — SIGNIFICANT CHANGE UP (ref 5–8)
PLATELET # BLD AUTO: 237 K/UL — SIGNIFICANT CHANGE UP (ref 150–400)
PMV BLD: 9.9 FL — SIGNIFICANT CHANGE UP (ref 7–13)
POTASSIUM SERPL-MCNC: 5.3 MMOL/L — SIGNIFICANT CHANGE UP (ref 3.5–5.3)
POTASSIUM SERPL-SCNC: 5.3 MMOL/L — SIGNIFICANT CHANGE UP (ref 3.5–5.3)
PROT UR-MCNC: 30 MG/DL
RBC # BLD: 3.89 M/UL — SIGNIFICANT CHANGE UP (ref 3.8–5.2)
RBC # FLD: 12.6 % — SIGNIFICANT CHANGE UP (ref 10.3–14.5)
RBC CASTS # UR COMP ASSIST: 1 /HPF — SIGNIFICANT CHANGE UP (ref 0–4)
SODIUM SERPL-SCNC: 139 MMOL/L — SIGNIFICANT CHANGE UP (ref 135–145)
SP GR SPEC: 1.03 — SIGNIFICANT CHANGE UP (ref 1–1.03)
SQUAMOUS # UR AUTO: 10 /HPF — HIGH (ref 0–5)
UROBILINOGEN FLD QL: 0.2 MG/DL — SIGNIFICANT CHANGE UP (ref 0.2–1)
WBC # BLD: 9.27 K/UL — SIGNIFICANT CHANGE UP (ref 3.8–10.5)
WBC # FLD AUTO: 9.27 K/UL — SIGNIFICANT CHANGE UP (ref 3.8–10.5)
WBC UR QL: 14 /HPF — HIGH (ref 0–5)

## 2025-03-12 PROCEDURE — 72195 MRI PELVIS W/O DYE: CPT | Mod: 26

## 2025-03-12 PROCEDURE — 88307 TISSUE EXAM BY PATHOLOGIST: CPT | Mod: 26

## 2025-03-12 PROCEDURE — 99233 SBSQ HOSP IP/OBS HIGH 50: CPT

## 2025-03-12 PROCEDURE — 88311 DECALCIFY TISSUE: CPT | Mod: 26

## 2025-03-12 DEVICE — SCREW LOKG 5X36MM: Type: IMPLANTABLE DEVICE | Site: RIGHT | Status: FUNCTIONAL

## 2025-03-12 DEVICE — BLADE TFNA HELICAL 100MM STRL: Type: IMPLANTABLE DEVICE | Site: RIGHT | Status: FUNCTIONAL

## 2025-03-12 DEVICE — NAIL CANN TI 130DEG 11X170MM: Type: IMPLANTABLE DEVICE | Site: RIGHT | Status: FUNCTIONAL

## 2025-03-12 RX ORDER — SENNA 187 MG
2 TABLET ORAL AT BEDTIME
Refills: 0 | Status: DISCONTINUED | OUTPATIENT
Start: 2025-03-12 | End: 2025-03-14

## 2025-03-12 RX ORDER — ATORVASTATIN CALCIUM 80 MG/1
40 TABLET, FILM COATED ORAL AT BEDTIME
Refills: 0 | Status: DISCONTINUED | OUTPATIENT
Start: 2025-03-12 | End: 2025-03-14

## 2025-03-12 RX ORDER — MELATONIN 5 MG
3 TABLET ORAL AT BEDTIME
Refills: 0 | Status: DISCONTINUED | OUTPATIENT
Start: 2025-03-12 | End: 2025-03-12

## 2025-03-12 RX ORDER — GLUCAGON 3 MG/1
1 POWDER NASAL ONCE
Refills: 0 | Status: DISCONTINUED | OUTPATIENT
Start: 2025-03-12 | End: 2025-03-12

## 2025-03-12 RX ORDER — INSULIN LISPRO 100 U/ML
6 INJECTION, SOLUTION INTRAVENOUS; SUBCUTANEOUS ONCE
Refills: 0 | Status: COMPLETED | OUTPATIENT
Start: 2025-03-12 | End: 2025-03-12

## 2025-03-12 RX ORDER — OXYCODONE HYDROCHLORIDE 30 MG/1
10 TABLET ORAL
Refills: 0 | Status: DISCONTINUED | OUTPATIENT
Start: 2025-03-12 | End: 2025-03-14

## 2025-03-12 RX ORDER — INFLUENZA A VIRUS A/IDAHO/07/2018 (H1N1) ANTIGEN (MDCK CELL DERIVED, PROPIOLACTONE INACTIVATED, INFLUENZA A VIRUS A/INDIANA/08/2018 (H3N2) ANTIGEN (MDCK CELL DERIVED, PROPIOLACTONE INACTIVATED), INFLUENZA B VIRUS B/SINGAPORE/INFTT-16-0610/2016 ANTIGEN (MDCK CELL DERIVED, PROPIOLACTONE INACTIVATED), INFLUENZA B VIRUS B/IOWA/06/2017 ANTIGEN (MDCK CELL DERIVED, PROPIOLACTONE INACTIVATED) 15; 15; 15; 15 UG/.5ML; UG/.5ML; UG/.5ML; UG/.5ML
0.5 INJECTION, SUSPENSION INTRAMUSCULAR ONCE
Refills: 0 | Status: DISCONTINUED | OUTPATIENT
Start: 2025-03-12 | End: 2025-03-14

## 2025-03-12 RX ORDER — PAROXETINE HYDROCHLORIDE 20 MG/1
20 TABLET, FILM COATED ORAL DAILY
Refills: 0 | Status: DISCONTINUED | OUTPATIENT
Start: 2025-03-12 | End: 2025-03-14

## 2025-03-12 RX ORDER — INSULIN LISPRO 100 U/ML
INJECTION, SOLUTION INTRAVENOUS; SUBCUTANEOUS
Refills: 0 | Status: DISCONTINUED | OUTPATIENT
Start: 2025-03-12 | End: 2025-03-14

## 2025-03-12 RX ORDER — CEFAZOLIN SODIUM IN 0.9 % NACL 3 G/100 ML
2000 INTRAVENOUS SOLUTION, PIGGYBACK (ML) INTRAVENOUS
Refills: 0 | Status: COMPLETED | OUTPATIENT
Start: 2025-03-12 | End: 2025-03-13

## 2025-03-12 RX ORDER — MEMANTINE HYDROCHLORIDE 21 MG/1
10 CAPSULE, EXTENDED RELEASE ORAL
Refills: 0 | Status: DISCONTINUED | OUTPATIENT
Start: 2025-03-12 | End: 2025-03-12

## 2025-03-12 RX ORDER — GLUCAGON 3 MG/1
1 POWDER NASAL ONCE
Refills: 0 | Status: DISCONTINUED | OUTPATIENT
Start: 2025-03-12 | End: 2025-03-14

## 2025-03-12 RX ORDER — SODIUM CHLORIDE 9 G/1000ML
1000 INJECTION, SOLUTION INTRAVENOUS
Refills: 0 | Status: DISCONTINUED | OUTPATIENT
Start: 2025-03-12 | End: 2025-03-14

## 2025-03-12 RX ORDER — MAGNESIUM HYDROXIDE 400 MG/5ML
30 SUSPENSION ORAL DAILY
Refills: 0 | Status: DISCONTINUED | OUTPATIENT
Start: 2025-03-12 | End: 2025-03-14

## 2025-03-12 RX ORDER — ENOXAPARIN SODIUM 100 MG/ML
40 INJECTION SUBCUTANEOUS EVERY 24 HOURS
Refills: 0 | Status: DISCONTINUED | OUTPATIENT
Start: 2025-03-13 | End: 2025-03-14

## 2025-03-12 RX ORDER — ONDANSETRON HCL/PF 4 MG/2 ML
4 VIAL (ML) INJECTION EVERY 8 HOURS
Refills: 0 | Status: DISCONTINUED | OUTPATIENT
Start: 2025-03-12 | End: 2025-03-12

## 2025-03-12 RX ORDER — ACETAMINOPHEN 500 MG/5ML
1000 LIQUID (ML) ORAL ONCE
Refills: 0 | Status: COMPLETED | OUTPATIENT
Start: 2025-03-12 | End: 2025-03-12

## 2025-03-12 RX ORDER — OXYCODONE HYDROCHLORIDE 30 MG/1
5 TABLET ORAL
Refills: 0 | Status: DISCONTINUED | OUTPATIENT
Start: 2025-03-12 | End: 2025-03-14

## 2025-03-12 RX ORDER — DEXTROSE 50 % IN WATER 50 %
25 SYRINGE (ML) INTRAVENOUS ONCE
Refills: 0 | Status: DISCONTINUED | OUTPATIENT
Start: 2025-03-12 | End: 2025-03-14

## 2025-03-12 RX ORDER — DEXTROSE 50 % IN WATER 50 %
15 SYRINGE (ML) INTRAVENOUS ONCE
Refills: 0 | Status: DISCONTINUED | OUTPATIENT
Start: 2025-03-12 | End: 2025-03-14

## 2025-03-12 RX ORDER — INSULIN LISPRO 100 U/ML
INJECTION, SOLUTION INTRAVENOUS; SUBCUTANEOUS AT BEDTIME
Refills: 0 | Status: DISCONTINUED | OUTPATIENT
Start: 2025-03-12 | End: 2025-03-14

## 2025-03-12 RX ORDER — AMLODIPINE BESYLATE 10 MG/1
2.5 TABLET ORAL DAILY
Refills: 0 | Status: DISCONTINUED | OUTPATIENT
Start: 2025-03-12 | End: 2025-03-14

## 2025-03-12 RX ORDER — HYDROMORPHONE/SOD CHLOR,ISO/PF 2 MG/10 ML
0.25 SYRINGE (ML) INJECTION
Refills: 0 | Status: DISCONTINUED | OUTPATIENT
Start: 2025-03-12 | End: 2025-03-12

## 2025-03-12 RX ORDER — ONDANSETRON HCL/PF 4 MG/2 ML
4 VIAL (ML) INJECTION ONCE
Refills: 0 | Status: DISCONTINUED | OUTPATIENT
Start: 2025-03-12 | End: 2025-03-12

## 2025-03-12 RX ORDER — PAROXETINE HYDROCHLORIDE 20 MG/1
20 TABLET, FILM COATED ORAL DAILY
Refills: 0 | Status: DISCONTINUED | OUTPATIENT
Start: 2025-03-12 | End: 2025-03-12

## 2025-03-12 RX ORDER — ATORVASTATIN CALCIUM 80 MG/1
40 TABLET, FILM COATED ORAL AT BEDTIME
Refills: 0 | Status: DISCONTINUED | OUTPATIENT
Start: 2025-03-12 | End: 2025-03-12

## 2025-03-12 RX ORDER — CARVEDILOL 3.12 MG/1
3.12 TABLET, FILM COATED ORAL EVERY 12 HOURS
Refills: 0 | Status: DISCONTINUED | OUTPATIENT
Start: 2025-03-12 | End: 2025-03-12

## 2025-03-12 RX ORDER — DEXTROSE 50 % IN WATER 50 %
25 SYRINGE (ML) INTRAVENOUS ONCE
Refills: 0 | Status: DISCONTINUED | OUTPATIENT
Start: 2025-03-12 | End: 2025-03-12

## 2025-03-12 RX ORDER — AMLODIPINE BESYLATE 10 MG/1
2.5 TABLET ORAL DAILY
Refills: 0 | Status: DISCONTINUED | OUTPATIENT
Start: 2025-03-12 | End: 2025-03-12

## 2025-03-12 RX ORDER — ACETAMINOPHEN 500 MG/5ML
975 LIQUID (ML) ORAL EVERY 8 HOURS
Refills: 0 | Status: DISCONTINUED | OUTPATIENT
Start: 2025-03-12 | End: 2025-03-14

## 2025-03-12 RX ORDER — FENTANYL CITRATE-0.9 % NACL/PF 100MCG/2ML
25 SYRINGE (ML) INTRAVENOUS
Refills: 0 | Status: DISCONTINUED | OUTPATIENT
Start: 2025-03-12 | End: 2025-03-12

## 2025-03-12 RX ORDER — CARVEDILOL 3.12 MG/1
3.12 TABLET, FILM COATED ORAL EVERY 12 HOURS
Refills: 0 | Status: DISCONTINUED | OUTPATIENT
Start: 2025-03-12 | End: 2025-03-14

## 2025-03-12 RX ORDER — MEMANTINE HYDROCHLORIDE 21 MG/1
10 CAPSULE, EXTENDED RELEASE ORAL
Refills: 0 | Status: DISCONTINUED | OUTPATIENT
Start: 2025-03-12 | End: 2025-03-14

## 2025-03-12 RX ORDER — HYDROMORPHONE/SOD CHLOR,ISO/PF 2 MG/10 ML
2 SYRINGE (ML) INJECTION EVERY 4 HOURS
Refills: 0 | Status: DISCONTINUED | OUTPATIENT
Start: 2025-03-12 | End: 2025-03-14

## 2025-03-12 RX ORDER — SODIUM CHLORIDE 9 G/1000ML
1000 INJECTION, SOLUTION INTRAVENOUS
Refills: 0 | Status: DISCONTINUED | OUTPATIENT
Start: 2025-03-12 | End: 2025-03-12

## 2025-03-12 RX ORDER — POLYETHYLENE GLYCOL 3350 17 G/17G
17 POWDER, FOR SOLUTION ORAL AT BEDTIME
Refills: 0 | Status: DISCONTINUED | OUTPATIENT
Start: 2025-03-12 | End: 2025-03-14

## 2025-03-12 RX ORDER — MAGNESIUM, ALUMINUM HYDROXIDE 200-200 MG
30 TABLET,CHEWABLE ORAL
Refills: 0 | Status: DISCONTINUED | OUTPATIENT
Start: 2025-03-12 | End: 2025-03-14

## 2025-03-12 RX ORDER — INSULIN LISPRO 100 U/ML
INJECTION, SOLUTION INTRAVENOUS; SUBCUTANEOUS
Refills: 0 | Status: DISCONTINUED | OUTPATIENT
Start: 2025-03-12 | End: 2025-03-12

## 2025-03-12 RX ORDER — DEXTROSE 50 % IN WATER 50 %
12.5 SYRINGE (ML) INTRAVENOUS ONCE
Refills: 0 | Status: DISCONTINUED | OUTPATIENT
Start: 2025-03-12 | End: 2025-03-14

## 2025-03-12 RX ORDER — DEXTROSE 50 % IN WATER 50 %
12.5 SYRINGE (ML) INTRAVENOUS ONCE
Refills: 0 | Status: DISCONTINUED | OUTPATIENT
Start: 2025-03-12 | End: 2025-03-12

## 2025-03-12 RX ORDER — DEXTROSE 50 % IN WATER 50 %
15 SYRINGE (ML) INTRAVENOUS ONCE
Refills: 0 | Status: DISCONTINUED | OUTPATIENT
Start: 2025-03-12 | End: 2025-03-12

## 2025-03-12 RX ORDER — MAGNESIUM, ALUMINUM HYDROXIDE 200-200 MG
30 TABLET,CHEWABLE ORAL EVERY 4 HOURS
Refills: 0 | Status: DISCONTINUED | OUTPATIENT
Start: 2025-03-12 | End: 2025-03-12

## 2025-03-12 RX ADMIN — Medication 1 APPLICATION(S): at 06:18

## 2025-03-12 RX ADMIN — INSULIN LISPRO 2: 100 INJECTION, SOLUTION INTRAVENOUS; SUBCUTANEOUS at 13:16

## 2025-03-12 RX ADMIN — MEMANTINE HYDROCHLORIDE 10 MILLIGRAM(S): 21 CAPSULE, EXTENDED RELEASE ORAL at 05:29

## 2025-03-12 RX ADMIN — AMLODIPINE BESYLATE 2.5 MILLIGRAM(S): 10 TABLET ORAL at 05:10

## 2025-03-12 RX ADMIN — Medication 0.25 MILLIGRAM(S): at 10:39

## 2025-03-12 RX ADMIN — POLYETHYLENE GLYCOL 3350 17 GRAM(S): 17 POWDER, FOR SOLUTION ORAL at 21:31

## 2025-03-12 RX ADMIN — CARVEDILOL 3.12 MILLIGRAM(S): 3.12 TABLET, FILM COATED ORAL at 18:06

## 2025-03-12 RX ADMIN — MEMANTINE HYDROCHLORIDE 10 MILLIGRAM(S): 21 CAPSULE, EXTENDED RELEASE ORAL at 18:05

## 2025-03-12 RX ADMIN — Medication 975 MILLIGRAM(S): at 21:33

## 2025-03-12 RX ADMIN — Medication 1000 MILLIGRAM(S): at 11:00

## 2025-03-12 RX ADMIN — INSULIN LISPRO 6 UNIT(S): 100 INJECTION, SOLUTION INTRAVENOUS; SUBCUTANEOUS at 10:48

## 2025-03-12 RX ADMIN — ATORVASTATIN CALCIUM 40 MILLIGRAM(S): 80 TABLET, FILM COATED ORAL at 21:32

## 2025-03-12 RX ADMIN — Medication 0.25 MILLIGRAM(S): at 10:48

## 2025-03-12 RX ADMIN — Medication 400 MILLIGRAM(S): at 05:10

## 2025-03-12 RX ADMIN — Medication 0.25 MILLIGRAM(S): at 10:29

## 2025-03-12 RX ADMIN — Medication 975 MILLIGRAM(S): at 22:33

## 2025-03-12 RX ADMIN — Medication 1 APPLICATION(S): at 08:08

## 2025-03-12 RX ADMIN — Medication 2 TABLET(S): at 21:33

## 2025-03-12 RX ADMIN — Medication 220 MILLIGRAM(S): at 00:01

## 2025-03-12 RX ADMIN — OXYCODONE HYDROCHLORIDE 5 MILLIGRAM(S): 30 TABLET ORAL at 06:17

## 2025-03-12 RX ADMIN — Medication 100 MILLILITER(S): at 21:37

## 2025-03-12 RX ADMIN — Medication 400 MILLIGRAM(S): at 10:48

## 2025-03-12 RX ADMIN — INSULIN LISPRO 2: 100 INJECTION, SOLUTION INTRAVENOUS; SUBCUTANEOUS at 18:06

## 2025-03-12 RX ADMIN — Medication 2000 MILLIGRAM(S): at 18:05

## 2025-03-12 RX ADMIN — MUPIROCIN CALCIUM 1 APPLICATION(S): 20 CREAM TOPICAL at 06:18

## 2025-03-12 RX ADMIN — CARVEDILOL 3.12 MILLIGRAM(S): 3.12 TABLET, FILM COATED ORAL at 05:10

## 2025-03-12 NOTE — PHYSICAL THERAPY INITIAL EVALUATION ADULT - NSPTDISCHREC_GEN_A_CORE
Bronchitis:  Bronchitis is an inflammation of the air passages (bronchial tubes) in your lungs. It often occurs in response to viral upper respiratory illnesses. Symptoms of bronchitis include cough with mucus (phlegm) and low-grade fever. Bronchitis usually lasts 7 to 14 days. Mild cases can be treated with simple home remedies. More severe cases are treated with steroids and sometimes an antibiotic.    Home care  Follow these guidelines when caring for yourself at home:  · Do not smoke. Also avoid being exposed to secondhand smoke.  · You may use over-the-counter medicines to control fever or pain, unless another medicine was prescribed. (Note: Aspirin should never be given to anyone younger than 18 years of age who is ill with a viral infection or fever)  · Your appetite may be poor, so a light diet is fine. Avoid dehydration by drinking 6 to 8 glasses of fluids per day (such as water, soft drinks, sports drinks, juices, tea, or soup). Extra fluids will help loosen secretions in the nose and lungs.  · Over-the-counter cough, cold, and sore-throat medicines will not shorten the length of the illness, but they may be helpful to reduce symptoms. (Note: Do not use decongestants if you have high blood pressure.)  ·   Follow-up care  Follow up with your healthcare provider, or as advised.   Note: If you are age 65 or older, or if you have a chronic lung disease or condition that affects your immune system, or you smoke, talk to your healthcare provider about having pneumococcal vaccinations and a yearly influenza vaccination (flu shot).    When to seek medical advice  Call your healthcare provider right away if any of these occur:  · Fever of 100.5°F (38°C) or higher  · Coughing up increased amounts of colored sputum  · Weakness, drowsiness, headache, facial pain, ear pain, or a stiff neck   Call 911, or get immediate medical care  Contact emergency services right away if any of these occur.  · Trouble breathing,  wheezing, or pain with breathing       home with assist/Home PT

## 2025-03-12 NOTE — ED ADULT NURSE REASSESSMENT NOTE - NS ED NURSE REASSESS COMMENT FT1
PT remains baseline mental status, Axo4.  Respirations even and unlabored. Reports relief of pain at this time. Denies need for pain medicine. Discussed plan of care and pt verbalized understanding of NPO orders. Pending MRI.

## 2025-03-12 NOTE — PHYSICAL THERAPY INITIAL EVALUATION ADULT - GENERAL OBSERVATIONS, REHAB EVAL
Blood pressure well controlled  Continue hydralazine and metoprolol Pt received in bed + IV + VCBs,  present, breathing on RA in NAD, in 0/10 pain at rest, 6/10 right hip pain with movement, agreeable to PT evaluation

## 2025-03-12 NOTE — ED ADULT NURSE REASSESSMENT NOTE - NS ED NURSE REASSESS COMMENT FT1
PT remains baseline mental status, Axo4. Respirations even and unlabored. Reports 0/10 pain right now at rest in stretcher. Labs drawn and sent to lab. Pt to be moved to CDU.

## 2025-03-12 NOTE — PHYSICAL THERAPY INITIAL EVALUATION ADULT - PERTINENT HX OF CURRENT PROBLEM, REHAB EVAL
presented to ED c/o right hip pain s/p mechanical fall. Patient states that she was getting off her couch tripped over her dog and fell landed on her right sided. s/p ORIF, hip, with intramedullary nail

## 2025-03-12 NOTE — PROGRESS NOTE ADULT - SUBJECTIVE AND OBJECTIVE BOX
Patient is a 74y old  Female who presents with a chief complaint of Right hip fracture (12 Mar 2025 10:20)      Patient seen and examined at bedside after surgery in PACU   she is awake alert   c/o unable to urinate overnight   bladder scan 307 right know as checked by nurse     will straight cath in 1 hour if does not void , nurse instructed     ALLERGIES:  No Known Allergies    MEDICATIONS  (STANDING):  acetaminophen     Tablet .. 975 milliGRAM(s) Oral every 8 hours  amLODIPine   Tablet 2.5 milliGRAM(s) Oral daily  atorvastatin 40 milliGRAM(s) Oral at bedtime  carvedilol 3.125 milliGRAM(s) Oral every 12 hours  ceFAZolin  Injectable. 2000 milliGRAM(s) IV Push <User Schedule>  dextrose 5%. 1000 milliLiter(s) (50 mL/Hr) IV Continuous <Continuous>  dextrose 5%. 1000 milliLiter(s) (100 mL/Hr) IV Continuous <Continuous>  dextrose 50% Injectable 25 Gram(s) IV Push once  dextrose 50% Injectable 12.5 Gram(s) IV Push once  dextrose 50% Injectable 25 Gram(s) IV Push once  glucagon  Injectable 1 milliGRAM(s) IntraMuscular once  influenza  Vaccine (HIGH DOSE) 0.5 milliLiter(s) IntraMuscular once  insulin lispro (ADMELOG) corrective regimen sliding scale   SubCutaneous three times a day before meals  insulin lispro (ADMELOG) corrective regimen sliding scale   SubCutaneous at bedtime  lactated ringers. 1000 milliLiter(s) (75 mL/Hr) IV Continuous <Continuous>  memantine 10 milliGRAM(s) Oral two times a day  pantoprazole    Tablet 40 milliGRAM(s) Oral before breakfast  PARoxetine 20 milliGRAM(s) Oral daily  polyethylene glycol 3350 17 Gram(s) Oral at bedtime  senna 2 Tablet(s) Oral at bedtime  sodium chloride 0.9%. 1000 milliLiter(s) (100 mL/Hr) IV Continuous <Continuous>    MEDICATIONS  (PRN):  aluminum hydroxide/magnesium hydroxide/simethicone Suspension 30 milliLiter(s) Oral four times a day PRN Indigestion  dextrose Oral Gel 15 Gram(s) Oral once PRN Blood Glucose LESS THAN 70 milliGRAM(s)/deciliter  fentaNYL    Injectable 25 MICROGram(s) IV Push every 5 minutes PRN Moderate Pain (4 - 6)  HYDROmorphone   Tablet 2 milliGRAM(s) Oral every 4 hours PRN Severe Pain (7 - 10)  HYDROmorphone  Injectable 0.25 milliGRAM(s) IV Push every 10 minutes PRN Severe Pain (7 - 10)  magnesium hydroxide Suspension 30 milliLiter(s) Oral daily PRN Constipation  ondansetron Injectable 4 milliGRAM(s) IV Push once PRN Nausea and/or Vomiting  oxyCODONE    IR 5 milliGRAM(s) Oral every 3 hours PRN Mild Pain (1 - 3)  oxyCODONE    IR 10 milliGRAM(s) Oral every 3 hours PRN Moderate Pain (4 - 6)    Vital Signs Last 24 Hrs  T(F): 97.2 (12 Mar 2025 10:05), Max: 98.2 (11 Mar 2025 19:33)  HR: 60 (12 Mar 2025 11:15) (54 - 76)  BP: 146/72 (12 Mar 2025 11:15) (123/74 - 161/81)  RR: 15 (12 Mar 2025 11:15) (13 - 18)  SpO2: 95% (12 Mar 2025 11:15) (95% - 99%)  I&O's Summary    12 Mar 2025 07:01  -  12 Mar 2025 11:34  --------------------------------------------------------  IN: 175 mL / OUT: 0 mL / NET: 175 mL        PHYSICAL EXAM:  General:   ENT:   Neck:   Lungs:   Cardio: RRR, S1/S2, No murmur  Abdomen: Soft, Nontender, Nondistended; Bowel sounds present  Extremities: No calf tenderness, No pitting edema    LABS:                        12.2   9.27  )-----------( 237      ( 12 Mar 2025 05:17 )             36.4     03-12    139  |  105  |  30.8  ----------------------------<  166  5.3   |  20.0  |  1.35    Ca    9.1      12 Mar 2025 05:17    TPro  7.4  /  Alb  4.1  /  TBili  0.4  /  DBili  x   /  AST  17  /  ALT  12  /  AlkPhos  133  03-11          PT/INR - ( 11 Mar 2025 18:20 )   PT: 11.2 sec;   INR: 0.97 ratio         PTT - ( 11 Mar 2025 18:20 )  PTT:33.1 sec                        POCT Blood Glucose.: 213 mg/dL (12 Mar 2025 10:05)  POCT Blood Glucose.: 162 mg/dL (12 Mar 2025 08:19)  POCT Blood Glucose.: 167 mg/dL (12 Mar 2025 07:59)      Urinalysis Basic - ( 12 Mar 2025 05:17 )    Color: x / Appearance: x / SG: x / pH: x  Gluc: 166 mg/dL / Ketone: x  / Bili: x / Urobili: x   Blood: x / Protein: x / Nitrite: x   Leuk Esterase: x / RBC: x / WBC x   Sq Epi: x / Non Sq Epi: x / Bacteria: x          RADIOLOGY & ADDITIONAL TESTS:       Patient is a 74y old  Female who presents with a chief complaint of Right hip fracture (12 Mar 2025 10:20)      Patient seen and examined at bedside after surgery in PACU   she is awake alert   c/o unable to urinate overnight   bladder scan 307 right know as checked by nurse     will straight cath in 1 hour if does not void , nurse instructed     ALLERGIES:  No Known Allergies    MEDICATIONS  (STANDING):  acetaminophen     Tablet .. 975 milliGRAM(s) Oral every 8 hours  amLODIPine   Tablet 2.5 milliGRAM(s) Oral daily  atorvastatin 40 milliGRAM(s) Oral at bedtime  carvedilol 3.125 milliGRAM(s) Oral every 12 hours  ceFAZolin  Injectable. 2000 milliGRAM(s) IV Push <User Schedule>  dextrose 5%. 1000 milliLiter(s) (50 mL/Hr) IV Continuous <Continuous>  dextrose 5%. 1000 milliLiter(s) (100 mL/Hr) IV Continuous <Continuous>  dextrose 50% Injectable 25 Gram(s) IV Push once  dextrose 50% Injectable 12.5 Gram(s) IV Push once  dextrose 50% Injectable 25 Gram(s) IV Push once  glucagon  Injectable 1 milliGRAM(s) IntraMuscular once  influenza  Vaccine (HIGH DOSE) 0.5 milliLiter(s) IntraMuscular once  insulin lispro (ADMELOG) corrective regimen sliding scale   SubCutaneous three times a day before meals  insulin lispro (ADMELOG) corrective regimen sliding scale   SubCutaneous at bedtime  lactated ringers. 1000 milliLiter(s) (75 mL/Hr) IV Continuous <Continuous>  memantine 10 milliGRAM(s) Oral two times a day  pantoprazole    Tablet 40 milliGRAM(s) Oral before breakfast  PARoxetine 20 milliGRAM(s) Oral daily  polyethylene glycol 3350 17 Gram(s) Oral at bedtime  senna 2 Tablet(s) Oral at bedtime  sodium chloride 0.9%. 1000 milliLiter(s) (100 mL/Hr) IV Continuous <Continuous>    MEDICATIONS  (PRN):  aluminum hydroxide/magnesium hydroxide/simethicone Suspension 30 milliLiter(s) Oral four times a day PRN Indigestion  dextrose Oral Gel 15 Gram(s) Oral once PRN Blood Glucose LESS THAN 70 milliGRAM(s)/deciliter  fentaNYL    Injectable 25 MICROGram(s) IV Push every 5 minutes PRN Moderate Pain (4 - 6)  HYDROmorphone   Tablet 2 milliGRAM(s) Oral every 4 hours PRN Severe Pain (7 - 10)  HYDROmorphone  Injectable 0.25 milliGRAM(s) IV Push every 10 minutes PRN Severe Pain (7 - 10)  magnesium hydroxide Suspension 30 milliLiter(s) Oral daily PRN Constipation  ondansetron Injectable 4 milliGRAM(s) IV Push once PRN Nausea and/or Vomiting  oxyCODONE    IR 5 milliGRAM(s) Oral every 3 hours PRN Mild Pain (1 - 3)  oxyCODONE    IR 10 milliGRAM(s) Oral every 3 hours PRN Moderate Pain (4 - 6)    Vital Signs Last 24 Hrs  T(F): 97.2 (12 Mar 2025 10:05), Max: 98.2 (11 Mar 2025 19:33)  HR: 60 (12 Mar 2025 11:15) (54 - 76)  BP: 146/72 (12 Mar 2025 11:15) (123/74 - 161/81)  RR: 15 (12 Mar 2025 11:15) (13 - 18)  SpO2: 95% (12 Mar 2025 11:15) (95% - 99%)  I&O's Summary    12 Mar 2025 07:01  -  12 Mar 2025 11:34  --------------------------------------------------------  IN: 175 mL / OUT: 0 mL / NET: 175 mL        PHYSICAL EXAM:  General: awake no distress   Neck: supple   Lungs: CTA   Cardio: RRR, S1/S2, No murmur  Abdomen: Soft, Nontender, Nondistended; Bowel sounds present  Extremities: No calf tenderness, No pitting edema  Skin warm normal color       LABS:                        12.2   9.27  )-----------( 237      ( 12 Mar 2025 05:17 )             36.4     03-12    139  |  105  |  30.8  ----------------------------<  166  5.3   |  20.0  |  1.35    Ca    9.1      12 Mar 2025 05:17    TPro  7.4  /  Alb  4.1  /  TBili  0.4  /  DBili  x   /  AST  17  /  ALT  12  /  AlkPhos  133  03-11          PT/INR - ( 11 Mar 2025 18:20 )   PT: 11.2 sec;   INR: 0.97 ratio         PTT - ( 11 Mar 2025 18:20 )  PTT:33.1 sec                        POCT Blood Glucose.: 213 mg/dL (12 Mar 2025 10:05)  POCT Blood Glucose.: 162 mg/dL (12 Mar 2025 08:19)  POCT Blood Glucose.: 167 mg/dL (12 Mar 2025 07:59)      Urinalysis Basic - ( 12 Mar 2025 05:17 )    Color: x / Appearance: x / SG: x / pH: x  Gluc: 166 mg/dL / Ketone: x  / Bili: x / Urobili: x   Blood: x / Protein: x / Nitrite: x   Leuk Esterase: x / RBC: x / WBC x   Sq Epi: x / Non Sq Epi: x / Bacteria: x          RADIOLOGY & ADDITIONAL TESTS:

## 2025-03-12 NOTE — PROGRESS NOTE ADULT - SUBJECTIVE AND OBJECTIVE BOX
ORTHOPEDIC POST-OP PROGRESS NOTE:    EZEKIEL MCKEON    [id number]    History: Patient is status post IMN of right hip POD # 0. Denies CP, SOB, dizziness, HA, fever/chills, numbness or tingling. No additional orthopedic complaints at this time.      Vital Signs Last 24 Hrs  T(C): 36.7 (12 Mar 2025 18:05), Max: 36.8 (11 Mar 2025 19:33)  T(F): 98 (12 Mar 2025 18:05), Max: 98.2 (11 Mar 2025 19:33)  HR: 58 (12 Mar 2025 18:05) (54 - 76)  BP: 160/74 (12 Mar 2025 18:05) (123/74 - 161/81)  BP(mean): 92 (12 Mar 2025 11:30) (90 - 112)  RR: 18 (12 Mar 2025 18:05) (13 - 18)  SpO2: 96% (12 Mar 2025 18:05) (95% - 99%)    Parameters below as of 12 Mar 2025 18:05  Patient On (Oxygen Delivery Method): room air                              12.2   9.27  )-----------( 237      ( 12 Mar 2025 05:17 )             36.4     03-12    139  |  105  |  30.8[H]  ----------------------------<  166[H]  5.3   |  20.0[L]  |  1.35[H]    Ca    9.1      12 Mar 2025 05:17    TPro  7.4  /  Alb  4.1  /  TBili  0.4  /  DBili  x   /  AST  17  /  ALT  12  /  AlkPhos  133[H]  03-11      General: Alert, awake, NAD  Physical exam: Dressings: dressing is clean, dry and intact. No drainage, discharge, erythema, blistering or ecchymosis. The calf is supple nontender b/l. SILT. +EHL/FHL/AT/GC. No foot drop. 2+ DP pulse. BCR. No cyanosis. compartments soft and compressible      Plan:   - DVT prophylaxis as prescribed, including use of compression devices and ankle pumps  -  Continue physical therapy  - Weight bearing status of surgical extremity: WBAT  - Incentive spirometry encouraged  - Pain control as clinically indicated  - Discharge planning – anticipated discharge is Home / subacute rehabilitation / acute rehabilitation

## 2025-03-12 NOTE — DISCHARGE NOTE PROVIDER - CARE PROVIDER_API CALL
Wyatt Navarrete  Orthopaedic Surgery  [address] [place] [address]  Phone: [phone]  Fax: [phone]  Follow Up Time:

## 2025-03-12 NOTE — DISCHARGE NOTE PROVIDER - NSDCFUADDINST_GEN_ALL_CORE_FT
ortho follow up: The patient will be seen in the office between 2-3 weeks for wound check. Sutures/Staples/Tape will be removed at that time. Patient may shower after post-op day #3. The dressing is to be removed on post-op day #9. IF THE DRESSING BECOMES SOILED BEFORE THE REMOVAL DATE, CHANGE WITH A SIMILAR DRESSING. IF THE DRESSING BECOMES STAINED WITH DISCHARGE, CONTACT THE OFFICE FOR FURTHER DIRECTIONS.  The patient will contact the office if the wound becomes red, has increasing pain, develops bleeding or discharge, an injury occurs, or has other concerns. The patient will continue PT for gait training. The patient will continue LOVENOX for 4 weeks and then begin ASPIRIN for blood clot prevention. The patient will take OXYCODONE AND TYLENOL for pain control and titrate according to prescription and patient needs. The patient will take Senna-S while taking oxycodone to prevent narcotic associated constipation.  Additionally, increase water intake (drink at least 8 glasses of water daily) and try adding fiber to the diet by eating fruits, vegetables and foods that are rich in grains. If constipation is experienced, contact the medical/primary care provider to discuss further treatment options. The patient is FULL weight bearing.

## 2025-03-12 NOTE — DISCHARGE NOTE PROVIDER - NSDCCPCAREPLAN_GEN_ALL_CORE_FT
PRINCIPAL DISCHARGE DIAGNOSIS  Diagnosis: Hip fracture, right  Assessment and Plan of Treatment: status post surgery ORIF , ambulate with assist and walker   take daily lovenox injection for blood clot prevention      SECONDARY DISCHARGE DIAGNOSES  Diagnosis: Hypertension  Assessment and Plan of Treatment: continue home meds    Diagnosis: Anemia due to blood loss  Assessment and Plan of Treatment: take daily iron supplement , monitor blood count    Diagnosis: DM (diabetes mellitus)  Assessment and Plan of Treatment:     Diagnosis: Acute prerenal failure  Assessment and Plan of Treatment: improved

## 2025-03-12 NOTE — PATIENT PROFILE ADULT - FALL HARM RISK - CONCLUSION
Patient: Savanna Rehman    Procedure: Procedure(s):  ESOPHAGOGASTRODUODENOSCOPY       Anesthesia Type:  General    Note:  Disposition: Outpatient   Postop Pain Control: Uneventful            Sign Out: Well controlled pain   PONV: No   Neuro/Psych: Uneventful            Sign Out: Acceptable/Baseline neuro status   Airway/Respiratory: Uneventful            Sign Out: Acceptable/Baseline resp. status   CV/Hemodynamics: Uneventful            Sign Out: Acceptable CV status; No obvious hypovolemia; No obvious fluid overload   Other NRE: NONE   DID A NON-ROUTINE EVENT OCCUR? No           Last vitals:  Vitals Value Taken Time   /110 08/12/24 1000   Temp 97.1  F (36.2  C) 08/12/24 0927   Pulse 61 08/12/24 1004   Resp 23 08/12/24 1004   SpO2 95 % 08/12/24 1005   Vitals shown include unfiled device data.    Electronically Signed By: Dwayne Do MD  August 12, 2024  11:00 AM  
Fall with Harm Risk

## 2025-03-12 NOTE — DISCHARGE NOTE PROVIDER - HOSPITAL COURSE
73 yo F with PMHx DM, and HTN presented to ED c/o right hip pain s/p mechanical fall at home   Patient states that she was getting off her couch tripped over her dog and fell landed on her right sided. Pt reports that she is currently pain free but c/o nausea and had one episode of vomiting during encounter Denies any cp, sob, palpitations, fever, chills. Xray with right hip fracture greater troch , seen by orthopedics   Pt had surgery 3/12  ORIF, hip, with intramedullary nail ,Pt is voiding post op , tolerated PT , post op anemina Hb drop , asymptomatic   iv venofer given   DC home in stable condition with home care , walker

## 2025-03-12 NOTE — DISCHARGE NOTE PROVIDER - NSDCMRMEDTOKEN_GEN_ALL_CORE_FT
amLODIPine 2.5 mg oral tablet: 1 tab(s) orally once a day  atorvastatin 40 mg tablet: 1 tab(s) orally once a day (at bedtime)  carvedilol 3.125 mg oral tablet: 1 tab(s) orally every 12 hours  memantine 10 mg oral tablet: 1 tab(s) orally 2 times a day  metformin  mg tablet,extended release 24 hr: 4 tab(s) orally once a day  paroxetine 20 mg tablet: 1 tab(s) orally once a day   acetaminophen 325 mg oral tablet: 3 tab(s) orally every 8 hours as needed for  hip pain  amLODIPine 5 mg oral tablet: 1 tab(s) orally once a day  atorvastatin 40 mg tablet: 1 tab(s) orally once a day (at bedtime)  carvedilol 3.125 mg oral tablet: 1 tab(s) orally every 12 hours  enoxaparin 40 mg/0.4 mL injectable solution: 40 milligram(s) subcutaneously once a day meds to bed by 4-5 pm  lisinopril 10 mg oral tablet: 1 tab(s) orally once a day  memantine 10 mg oral tablet: 1 tab(s) orally 2 times a day  metformin  mg tablet,extended release 24 hr: 4 tab(s) orally once a day  pantoprazole 40 mg oral delayed release tablet: 1 tab(s) orally once a day (before a meal)  paroxetine 20 mg tablet: 1 tab(s) orally once a day  polyethylene glycol 3350 oral powder for reconstitution: 17 gram(s) orally once a day (at bedtime)  senna leaf extract oral tablet: 2 tab(s) orally once a day (at bedtime)   acetaminophen 325 mg oral tablet: 3 tab(s) orally every 8 hours as needed for  hip pain  amLODIPine 5 mg oral tablet: 1 tab(s) orally once a day  atorvastatin 40 mg tablet: 1 tab(s) orally once a day (at bedtime)  carvedilol 3.125 mg oral tablet: 1 tab(s) orally every 12 hours  enoxaparin 40 mg/0.4 mL injectable solution: 40 milligram(s) subcutaneously once a day meds to bed by 4-5 pm  ferrous sulfate 325 mg (65 mg elemental iron) oral delayed release tablet: 1 tab(s) orally once a day  lisinopril 10 mg oral tablet: 1 tab(s) orally once a day  memantine 10 mg oral tablet: 1 tab(s) orally 2 times a day  metformin  mg tablet,extended release 24 hr: 4 tab(s) orally once a day  pantoprazole 40 mg oral delayed release tablet: 1 tab(s) orally once a day (before a meal)  paroxetine 20 mg tablet: 1 tab(s) orally once a day  polyethylene glycol 3350 oral powder for reconstitution: 17 gram(s) orally once a day (at bedtime)  senna leaf extract oral tablet: 2 tab(s) orally once a day (at bedtime)

## 2025-03-12 NOTE — PHYSICAL THERAPY INITIAL EVALUATION ADULT - GAIT DISTANCE, PT EVAL
pt with reports of lightheadedness/eyes closing, returned to supine & reverse Trendelenburg ~3 minutes. Symptoms resolved. Vitals obtained 137/73, SpO2 94% HR 57/5 feet

## 2025-03-13 LAB
A1C WITH ESTIMATED AVERAGE GLUCOSE RESULT: 7.1 % — HIGH (ref 4–5.6)
ANION GAP SERPL CALC-SCNC: 13 MMOL/L — SIGNIFICANT CHANGE UP (ref 5–17)
BUN SERPL-MCNC: 24 MG/DL — HIGH (ref 8–20)
CALCIUM SERPL-MCNC: 8.7 MG/DL — SIGNIFICANT CHANGE UP (ref 8.4–10.5)
CHLORIDE SERPL-SCNC: 105 MMOL/L — SIGNIFICANT CHANGE UP (ref 96–108)
CO2 SERPL-SCNC: 20 MMOL/L — LOW (ref 22–29)
CREAT SERPL-MCNC: 1.3 MG/DL — SIGNIFICANT CHANGE UP (ref 0.5–1.3)
EGFR: 43 ML/MIN/1.73M2 — LOW
EGFR: 43 ML/MIN/1.73M2 — LOW
ESTIMATED AVERAGE GLUCOSE: 157 MG/DL — HIGH (ref 68–114)
GLUCOSE BLDC GLUCOMTR-MCNC: 153 MG/DL — HIGH (ref 70–99)
GLUCOSE BLDC GLUCOMTR-MCNC: 156 MG/DL — HIGH (ref 70–99)
GLUCOSE BLDC GLUCOMTR-MCNC: 166 MG/DL — HIGH (ref 70–99)
GLUCOSE BLDC GLUCOMTR-MCNC: 167 MG/DL — HIGH (ref 70–99)
GLUCOSE SERPL-MCNC: 148 MG/DL — HIGH (ref 70–99)
HCT VFR BLD CALC: 27.2 % — LOW (ref 34.5–45)
HGB BLD-MCNC: 9 G/DL — LOW (ref 11.5–15.5)
IRON SATN MFR SERPL: 15 % — SIGNIFICANT CHANGE UP (ref 14–50)
IRON SATN MFR SERPL: 46 UG/DL — SIGNIFICANT CHANGE UP (ref 37–145)
MAGNESIUM SERPL-MCNC: 1.6 MG/DL — SIGNIFICANT CHANGE UP (ref 1.6–2.6)
MCHC RBC-ENTMCNC: 31.3 PG — SIGNIFICANT CHANGE UP (ref 27–34)
MCHC RBC-ENTMCNC: 33.1 G/DL — SIGNIFICANT CHANGE UP (ref 32–36)
MCV RBC AUTO: 94.4 FL — SIGNIFICANT CHANGE UP (ref 80–100)
NRBC # BLD AUTO: 0 K/UL — SIGNIFICANT CHANGE UP (ref 0–0)
NRBC # FLD: 0 K/UL — SIGNIFICANT CHANGE UP (ref 0–0)
NRBC BLD AUTO-RTO: 0 /100 WBCS — SIGNIFICANT CHANGE UP (ref 0–0)
PHOSPHATE SERPL-MCNC: 3 MG/DL — SIGNIFICANT CHANGE UP (ref 2.4–4.7)
PLATELET # BLD AUTO: 254 K/UL — SIGNIFICANT CHANGE UP (ref 150–400)
PMV BLD: 10.1 FL — SIGNIFICANT CHANGE UP (ref 7–13)
POTASSIUM SERPL-MCNC: 4.3 MMOL/L — SIGNIFICANT CHANGE UP (ref 3.5–5.3)
POTASSIUM SERPL-SCNC: 4.3 MMOL/L — SIGNIFICANT CHANGE UP (ref 3.5–5.3)
RBC # BLD: 2.88 M/UL — LOW (ref 3.8–5.2)
RBC # FLD: 12.5 % — SIGNIFICANT CHANGE UP (ref 10.3–14.5)
SODIUM SERPL-SCNC: 138 MMOL/L — SIGNIFICANT CHANGE UP (ref 135–145)
TIBC SERPL-MCNC: 306 UG/DL — SIGNIFICANT CHANGE UP (ref 220–430)
TRANSFERRIN SERPL-MCNC: 214 MG/DL — SIGNIFICANT CHANGE UP (ref 192–382)
WBC # BLD: 11.83 K/UL — HIGH (ref 3.8–10.5)
WBC # FLD AUTO: 11.83 K/UL — HIGH (ref 3.8–10.5)

## 2025-03-13 PROCEDURE — 99232 SBSQ HOSP IP/OBS MODERATE 35: CPT

## 2025-03-13 RX ORDER — MAGNESIUM SULFATE 500 MG/ML
2 SYRINGE (ML) INJECTION ONCE
Refills: 0 | Status: COMPLETED | OUTPATIENT
Start: 2025-03-13 | End: 2025-03-13

## 2025-03-13 RX ADMIN — PAROXETINE HYDROCHLORIDE 20 MILLIGRAM(S): 20 TABLET, FILM COATED ORAL at 14:21

## 2025-03-13 RX ADMIN — Medication 2000 MILLIGRAM(S): at 01:43

## 2025-03-13 RX ADMIN — CARVEDILOL 3.12 MILLIGRAM(S): 3.12 TABLET, FILM COATED ORAL at 05:13

## 2025-03-13 RX ADMIN — Medication 25 GRAM(S): at 11:47

## 2025-03-13 RX ADMIN — ENOXAPARIN SODIUM 40 MILLIGRAM(S): 100 INJECTION SUBCUTANEOUS at 05:12

## 2025-03-13 RX ADMIN — AMLODIPINE BESYLATE 2.5 MILLIGRAM(S): 10 TABLET ORAL at 05:13

## 2025-03-13 RX ADMIN — INSULIN LISPRO 2: 100 INJECTION, SOLUTION INTRAVENOUS; SUBCUTANEOUS at 17:22

## 2025-03-13 RX ADMIN — CARVEDILOL 3.12 MILLIGRAM(S): 3.12 TABLET, FILM COATED ORAL at 17:23

## 2025-03-13 RX ADMIN — Medication 975 MILLIGRAM(S): at 05:13

## 2025-03-13 RX ADMIN — Medication 975 MILLIGRAM(S): at 15:21

## 2025-03-13 RX ADMIN — MEMANTINE HYDROCHLORIDE 10 MILLIGRAM(S): 21 CAPSULE, EXTENDED RELEASE ORAL at 17:23

## 2025-03-13 RX ADMIN — ATORVASTATIN CALCIUM 40 MILLIGRAM(S): 80 TABLET, FILM COATED ORAL at 21:07

## 2025-03-13 RX ADMIN — OXYCODONE HYDROCHLORIDE 5 MILLIGRAM(S): 30 TABLET ORAL at 07:53

## 2025-03-13 RX ADMIN — MEMANTINE HYDROCHLORIDE 10 MILLIGRAM(S): 21 CAPSULE, EXTENDED RELEASE ORAL at 05:13

## 2025-03-13 RX ADMIN — Medication 975 MILLIGRAM(S): at 14:21

## 2025-03-13 RX ADMIN — Medication 975 MILLIGRAM(S): at 06:13

## 2025-03-13 RX ADMIN — INSULIN LISPRO 2: 100 INJECTION, SOLUTION INTRAVENOUS; SUBCUTANEOUS at 08:01

## 2025-03-13 RX ADMIN — INSULIN LISPRO 2: 100 INJECTION, SOLUTION INTRAVENOUS; SUBCUTANEOUS at 11:23

## 2025-03-13 RX ADMIN — Medication 975 MILLIGRAM(S): at 22:07

## 2025-03-13 RX ADMIN — Medication 975 MILLIGRAM(S): at 21:07

## 2025-03-13 RX ADMIN — OXYCODONE HYDROCHLORIDE 5 MILLIGRAM(S): 30 TABLET ORAL at 08:53

## 2025-03-13 NOTE — PROGRESS NOTE ADULT - SUBJECTIVE AND OBJECTIVE BOX
ORTHOPEDIC POST-OP PROGRESS NOTE:    EZEKIEL MCKEON    [id number]    History: Patient is status post IMN of right hip fracture POD # 1. Patient is doing well and is comfortable. The patient's pain is controlled using the prescribed pain medications. The patient is participating in physical therapy. Denies CP, SOB, dizziness, HA, fever/chills, numbness or tingling. No additional orthopedic complaints at this time.      Vital Signs Last 24 Hrs  T(C): 36.7 (13 Mar 2025 04:35), Max: 36.7 (12 Mar 2025 18:05)  T(F): 98 (13 Mar 2025 04:35), Max: 98 (12 Mar 2025 18:05)  HR: 70 (13 Mar 2025 04:35) (57 - 76)  BP: 158/70 (13 Mar 2025 04:35) (133/75 - 161/81)  BP(mean): 92 (12 Mar 2025 11:30) (90 - 112)  RR: 18 (13 Mar 2025 04:35) (13 - 18)  SpO2: 92% (13 Mar 2025 04:35) (92% - 99%)    Parameters below as of 13 Mar 2025 04:35  Patient On (Oxygen Delivery Method): room air                              9.0    11.83 )-----------( 254      ( 13 Mar 2025 04:36 )             27.2     03-13    138  |  105  |  24.0[H]  ----------------------------<  148[H]  4.3   |  20.0[L]  |  1.30    Ca    8.7      13 Mar 2025 04:36  Phos  3.0     03-13  Mg     1.6     03-13    TPro  7.4  /  Alb  4.1  /  TBili  0.4  /  DBili  x   /  AST  17  /  ALT  12  /  AlkPhos  133[H]  03-11      General: Alert, awake, NAD  Physical exam: Dressings: dressing is clean, dry and intact. No drainage, discharge, erythema, blistering or ecchymosis. The calf is supple nontender b/l. SILT. +EHL/FHL/AT/GC. No foot drop. 2+ DP pulse. BCR. No cyanosis. compartments soft and compressible      Plan:   - DVT prophylaxis as prescribed, including use of compression devices and ankle pumps  - Begin physical therapy  - Weight bearing status of surgical extremity: WBAT  - Incentive spirometry encouraged  - Pain control as clinically indicated  - Discharge planning – anticipated discharge is Home / subacute rehabilitation / acute rehabilitation

## 2025-03-13 NOTE — PROGRESS NOTE ADULT - SUBJECTIVE AND OBJECTIVE BOX
Patient is a 74y old  Female who presents with a chief complaint of Right hip fracture (13 Mar 2025 07:54)      Patient seen and examined at bedside. No overnight events reported.     ALLERGIES:  No Known Allergies    MEDICATIONS  (STANDING):  acetaminophen     Tablet .. 975 milliGRAM(s) Oral every 8 hours  amLODIPine   Tablet 2.5 milliGRAM(s) Oral daily  atorvastatin 40 milliGRAM(s) Oral at bedtime  carvedilol 3.125 milliGRAM(s) Oral every 12 hours  dextrose 5%. 1000 milliLiter(s) (50 mL/Hr) IV Continuous <Continuous>  dextrose 5%. 1000 milliLiter(s) (100 mL/Hr) IV Continuous <Continuous>  dextrose 50% Injectable 25 Gram(s) IV Push once  dextrose 50% Injectable 12.5 Gram(s) IV Push once  dextrose 50% Injectable 25 Gram(s) IV Push once  enoxaparin Injectable 40 milliGRAM(s) SubCutaneous every 24 hours  glucagon  Injectable 1 milliGRAM(s) IntraMuscular once  influenza  Vaccine (HIGH DOSE) 0.5 milliLiter(s) IntraMuscular once  insulin lispro (ADMELOG) corrective regimen sliding scale   SubCutaneous three times a day before meals  insulin lispro (ADMELOG) corrective regimen sliding scale   SubCutaneous at bedtime  memantine 10 milliGRAM(s) Oral two times a day  pantoprazole    Tablet 40 milliGRAM(s) Oral before breakfast  PARoxetine 20 milliGRAM(s) Oral daily  polyethylene glycol 3350 17 Gram(s) Oral at bedtime  senna 2 Tablet(s) Oral at bedtime  sodium chloride 0.9%. 1000 milliLiter(s) (100 mL/Hr) IV Continuous <Continuous>    MEDICATIONS  (PRN):  aluminum hydroxide/magnesium hydroxide/simethicone Suspension 30 milliLiter(s) Oral four times a day PRN Indigestion  dextrose Oral Gel 15 Gram(s) Oral once PRN Blood Glucose LESS THAN 70 milliGRAM(s)/deciliter  HYDROmorphone   Tablet 2 milliGRAM(s) Oral every 4 hours PRN Severe Pain (7 - 10)  magnesium hydroxide Suspension 30 milliLiter(s) Oral daily PRN Constipation  oxyCODONE    IR 5 milliGRAM(s) Oral every 3 hours PRN Mild Pain (1 - 3)  oxyCODONE    IR 10 milliGRAM(s) Oral every 3 hours PRN Moderate Pain (4 - 6)    Vital Signs Last 24 Hrs  T(F): 97.9 (13 Mar 2025 08:14), Max: 98 (12 Mar 2025 18:05)  HR: 60 (13 Mar 2025 08:14) (57 - 70)  BP: 164/76 (13 Mar 2025 08:14) (133/75 - 164/76)  RR: 18 (13 Mar 2025 08:14) (13 - 18)  SpO2: 93% (13 Mar 2025 08:14) (92% - 96%)  I&O's Summary    12 Mar 2025 07:01  -  13 Mar 2025 07:00  --------------------------------------------------------  IN: 1575 mL / OUT: 1250 mL / NET: 325 mL        PHYSICAL EXAM:  General:   ENT:   Neck:   Lungs:   Cardio: RRR, S1/S2, No murmur  Abdomen: Soft, Nontender, Nondistended; Bowel sounds present  Extremities: No calf tenderness, No pitting edema    LABS:                        9.0    11.83 )-----------( 254      ( 13 Mar 2025 04:36 )             27.2     03-13    138  |  105  |  24.0  ----------------------------<  148  4.3   |  20.0  |  1.30    Ca    8.7      13 Mar 2025 04:36  Phos  3.0     03-13  Mg     1.6     03-13    TPro  7.4  /  Alb  4.1  /  TBili  0.4  /  DBili  x   /  AST  17  /  ALT  12  /  AlkPhos  133  03-11          PT/INR - ( 11 Mar 2025 18:20 )   PT: 11.2 sec;   INR: 0.97 ratio         PTT - ( 11 Mar 2025 18:20 )  PTT:33.1 sec                        POCT Blood Glucose.: 166 mg/dL (13 Mar 2025 08:00)  POCT Blood Glucose.: 240 mg/dL (12 Mar 2025 21:31)  POCT Blood Glucose.: 175 mg/dL (12 Mar 2025 18:03)  POCT Blood Glucose.: 175 mg/dL (12 Mar 2025 13:12)      Urinalysis Basic - ( 13 Mar 2025 04:36 )    Color: x / Appearance: x / SG: x / pH: x  Gluc: 148 mg/dL / Ketone: x  / Bili: x / Urobili: x   Blood: x / Protein: x / Nitrite: x   Leuk Esterase: x / RBC: x / WBC x   Sq Epi: x / Non Sq Epi: x / Bacteria: x          RADIOLOGY & ADDITIONAL TESTS:

## 2025-03-14 ENCOUNTER — TRANSCRIPTION ENCOUNTER (OUTPATIENT)
Age: 75
End: 2025-03-14

## 2025-03-14 VITALS
TEMPERATURE: 98 F | HEART RATE: 75 BPM | DIASTOLIC BLOOD PRESSURE: 70 MMHG | RESPIRATION RATE: 18 BRPM | OXYGEN SATURATION: 95 % | SYSTOLIC BLOOD PRESSURE: 147 MMHG

## 2025-03-14 LAB
ANION GAP SERPL CALC-SCNC: 15 MMOL/L — SIGNIFICANT CHANGE UP (ref 5–17)
BUN SERPL-MCNC: 18.9 MG/DL — SIGNIFICANT CHANGE UP (ref 8–20)
CALCIUM SERPL-MCNC: 8.8 MG/DL — SIGNIFICANT CHANGE UP (ref 8.4–10.5)
CHLORIDE SERPL-SCNC: 104 MMOL/L — SIGNIFICANT CHANGE UP (ref 96–108)
CO2 SERPL-SCNC: 19 MMOL/L — LOW (ref 22–29)
CREAT SERPL-MCNC: 1.08 MG/DL — SIGNIFICANT CHANGE UP (ref 0.5–1.3)
CULTURE RESULTS: NO GROWTH — SIGNIFICANT CHANGE UP
EGFR: 53 ML/MIN/1.73M2 — LOW
EGFR: 53 ML/MIN/1.73M2 — LOW
FOLATE SERPL-MCNC: 8.7 NG/ML — SIGNIFICANT CHANGE UP
GLUCOSE BLDC GLUCOMTR-MCNC: 150 MG/DL — HIGH (ref 70–99)
GLUCOSE BLDC GLUCOMTR-MCNC: 178 MG/DL — HIGH (ref 70–99)
GLUCOSE BLDC GLUCOMTR-MCNC: 179 MG/DL — HIGH (ref 70–99)
GLUCOSE SERPL-MCNC: 153 MG/DL — HIGH (ref 70–99)
HCT VFR BLD CALC: 24.8 % — LOW (ref 34.5–45)
HGB BLD-MCNC: 8.3 G/DL — LOW (ref 11.5–15.5)
MCHC RBC-ENTMCNC: 31 PG — SIGNIFICANT CHANGE UP (ref 27–34)
MCHC RBC-ENTMCNC: 33.5 G/DL — SIGNIFICANT CHANGE UP (ref 32–36)
MCV RBC AUTO: 92.5 FL — SIGNIFICANT CHANGE UP (ref 80–100)
NRBC # BLD AUTO: 0 K/UL — SIGNIFICANT CHANGE UP (ref 0–0)
NRBC # FLD: 0 K/UL — SIGNIFICANT CHANGE UP (ref 0–0)
NRBC BLD AUTO-RTO: 0 /100 WBCS — SIGNIFICANT CHANGE UP (ref 0–0)
PLATELET # BLD AUTO: 226 K/UL — SIGNIFICANT CHANGE UP (ref 150–400)
PMV BLD: 10.1 FL — SIGNIFICANT CHANGE UP (ref 7–13)
POTASSIUM SERPL-MCNC: 4.1 MMOL/L — SIGNIFICANT CHANGE UP (ref 3.5–5.3)
POTASSIUM SERPL-SCNC: 4.1 MMOL/L — SIGNIFICANT CHANGE UP (ref 3.5–5.3)
RBC # BLD: 2.68 M/UL — LOW (ref 3.8–5.2)
RBC # FLD: 12.6 % — SIGNIFICANT CHANGE UP (ref 10.3–14.5)
SODIUM SERPL-SCNC: 138 MMOL/L — SIGNIFICANT CHANGE UP (ref 135–145)
SPECIMEN SOURCE: SIGNIFICANT CHANGE UP
VIT B12 SERPL-MCNC: 844 PG/ML — SIGNIFICANT CHANGE UP (ref 232–1245)
WBC # BLD: 9.32 K/UL — SIGNIFICANT CHANGE UP (ref 3.8–10.5)
WBC # FLD AUTO: 9.32 K/UL — SIGNIFICANT CHANGE UP (ref 3.8–10.5)

## 2025-03-14 PROCEDURE — 70450 CT HEAD/BRAIN W/O DYE: CPT | Mod: MC

## 2025-03-14 PROCEDURE — 85025 COMPLETE CBC W/AUTO DIFF WBC: CPT

## 2025-03-14 PROCEDURE — 88307 TISSUE EXAM BY PATHOLOGIST: CPT

## 2025-03-14 PROCEDURE — 73502 X-RAY EXAM HIP UNI 2-3 VIEWS: CPT

## 2025-03-14 PROCEDURE — 96374 THER/PROPH/DIAG INJ IV PUSH: CPT

## 2025-03-14 PROCEDURE — 83550 IRON BINDING TEST: CPT

## 2025-03-14 PROCEDURE — 82607 VITAMIN B-12: CPT

## 2025-03-14 PROCEDURE — 85610 PROTHROMBIN TIME: CPT

## 2025-03-14 PROCEDURE — 36415 COLL VENOUS BLD VENIPUNCTURE: CPT

## 2025-03-14 PROCEDURE — 82962 GLUCOSE BLOOD TEST: CPT

## 2025-03-14 PROCEDURE — 99239 HOSP IP/OBS DSCHRG MGMT >30: CPT

## 2025-03-14 PROCEDURE — 87640 STAPH A DNA AMP PROBE: CPT

## 2025-03-14 PROCEDURE — 72195 MRI PELVIS W/O DYE: CPT

## 2025-03-14 PROCEDURE — 86901 BLOOD TYPING SEROLOGIC RH(D): CPT

## 2025-03-14 PROCEDURE — 72192 CT PELVIS W/O DYE: CPT | Mod: MC

## 2025-03-14 PROCEDURE — 81001 URINALYSIS AUTO W/SCOPE: CPT

## 2025-03-14 PROCEDURE — 87086 URINE CULTURE/COLONY COUNT: CPT

## 2025-03-14 PROCEDURE — C1713: CPT

## 2025-03-14 PROCEDURE — 83540 ASSAY OF IRON: CPT

## 2025-03-14 PROCEDURE — 86900 BLOOD TYPING SEROLOGIC ABO: CPT

## 2025-03-14 PROCEDURE — 85730 THROMBOPLASTIN TIME PARTIAL: CPT

## 2025-03-14 PROCEDURE — 84100 ASSAY OF PHOSPHORUS: CPT

## 2025-03-14 PROCEDURE — 88311 DECALCIFY TISSUE: CPT

## 2025-03-14 PROCEDURE — 83036 HEMOGLOBIN GLYCOSYLATED A1C: CPT

## 2025-03-14 PROCEDURE — 84466 ASSAY OF TRANSFERRIN: CPT

## 2025-03-14 PROCEDURE — 87641 MR-STAPH DNA AMP PROBE: CPT

## 2025-03-14 PROCEDURE — 76000 FLUOROSCOPY <1 HR PHYS/QHP: CPT

## 2025-03-14 PROCEDURE — 97110 THERAPEUTIC EXERCISES: CPT

## 2025-03-14 PROCEDURE — 83735 ASSAY OF MAGNESIUM: CPT

## 2025-03-14 PROCEDURE — 97116 GAIT TRAINING THERAPY: CPT

## 2025-03-14 PROCEDURE — 85027 COMPLETE CBC AUTOMATED: CPT

## 2025-03-14 PROCEDURE — 86850 RBC ANTIBODY SCREEN: CPT

## 2025-03-14 PROCEDURE — 82746 ASSAY OF FOLIC ACID SERUM: CPT

## 2025-03-14 PROCEDURE — 71045 X-RAY EXAM CHEST 1 VIEW: CPT

## 2025-03-14 PROCEDURE — 80048 BASIC METABOLIC PNL TOTAL CA: CPT

## 2025-03-14 PROCEDURE — 99285 EMERGENCY DEPT VISIT HI MDM: CPT | Mod: 25

## 2025-03-14 PROCEDURE — 80053 COMPREHEN METABOLIC PANEL: CPT

## 2025-03-14 PROCEDURE — C9399: CPT

## 2025-03-14 PROCEDURE — 93005 ELECTROCARDIOGRAM TRACING: CPT

## 2025-03-14 RX ORDER — IRON SUCROSE 20 MG/ML
200 INJECTION, SOLUTION INTRAVENOUS ONCE
Refills: 0 | Status: COMPLETED | OUTPATIENT
Start: 2025-03-14 | End: 2025-03-14

## 2025-03-14 RX ORDER — FERROUS SULFATE 137(45) MG
1 TABLET, EXTENDED RELEASE ORAL
Qty: 30 | Refills: 0
Start: 2025-03-14 | End: 2025-04-12

## 2025-03-14 RX ORDER — SENNA 187 MG
2 TABLET ORAL
Qty: 0 | Refills: 0 | DISCHARGE
Start: 2025-03-14

## 2025-03-14 RX ORDER — AMLODIPINE BESYLATE 10 MG/1
1 TABLET ORAL
Qty: 0 | Refills: 0 | DISCHARGE
Start: 2025-03-14

## 2025-03-14 RX ORDER — POLYETHYLENE GLYCOL 3350 17 G/17G
17 POWDER, FOR SOLUTION ORAL
Qty: 0 | Refills: 0 | DISCHARGE
Start: 2025-03-14

## 2025-03-14 RX ORDER — AMLODIPINE BESYLATE 10 MG/1
5 TABLET ORAL DAILY
Refills: 0 | Status: DISCONTINUED | OUTPATIENT
Start: 2025-03-15 | End: 2025-03-14

## 2025-03-14 RX ORDER — AMLODIPINE BESYLATE 10 MG/1
5 TABLET ORAL DAILY
Refills: 0 | Status: DISCONTINUED | OUTPATIENT
Start: 2025-03-14 | End: 2025-03-14

## 2025-03-14 RX ORDER — ACETAMINOPHEN 500 MG/5ML
3 LIQUID (ML) ORAL
Qty: 0 | Refills: 0 | DISCHARGE
Start: 2025-03-14

## 2025-03-14 RX ORDER — LISINOPRIL 5 MG/1
1 TABLET ORAL
Qty: 0 | Refills: 0 | DISCHARGE
Start: 2025-03-14

## 2025-03-14 RX ORDER — LISINOPRIL 5 MG/1
10 TABLET ORAL DAILY
Refills: 0 | Status: DISCONTINUED | OUTPATIENT
Start: 2025-03-14 | End: 2025-03-14

## 2025-03-14 RX ORDER — ENOXAPARIN SODIUM 100 MG/ML
40 INJECTION SUBCUTANEOUS
Qty: 30 | Refills: 0
Start: 2025-03-14 | End: 2025-04-10

## 2025-03-14 RX ADMIN — INSULIN LISPRO 2: 100 INJECTION, SOLUTION INTRAVENOUS; SUBCUTANEOUS at 08:05

## 2025-03-14 RX ADMIN — AMLODIPINE BESYLATE 2.5 MILLIGRAM(S): 10 TABLET ORAL at 05:24

## 2025-03-14 RX ADMIN — Medication 975 MILLIGRAM(S): at 05:57

## 2025-03-14 RX ADMIN — MEMANTINE HYDROCHLORIDE 10 MILLIGRAM(S): 21 CAPSULE, EXTENDED RELEASE ORAL at 17:15

## 2025-03-14 RX ADMIN — Medication 975 MILLIGRAM(S): at 19:05

## 2025-03-14 RX ADMIN — INSULIN LISPRO 2: 100 INJECTION, SOLUTION INTRAVENOUS; SUBCUTANEOUS at 17:16

## 2025-03-14 RX ADMIN — PAROXETINE HYDROCHLORIDE 20 MILLIGRAM(S): 20 TABLET, FILM COATED ORAL at 11:39

## 2025-03-14 RX ADMIN — LISINOPRIL 10 MILLIGRAM(S): 5 TABLET ORAL at 11:39

## 2025-03-14 RX ADMIN — Medication 975 MILLIGRAM(S): at 05:24

## 2025-03-14 RX ADMIN — MEMANTINE HYDROCHLORIDE 10 MILLIGRAM(S): 21 CAPSULE, EXTENDED RELEASE ORAL at 05:24

## 2025-03-14 RX ADMIN — IRON SUCROSE 110 MILLIGRAM(S): 20 INJECTION, SOLUTION INTRAVENOUS at 11:39

## 2025-03-14 RX ADMIN — CARVEDILOL 3.12 MILLIGRAM(S): 3.12 TABLET, FILM COATED ORAL at 17:15

## 2025-03-14 RX ADMIN — CARVEDILOL 3.12 MILLIGRAM(S): 3.12 TABLET, FILM COATED ORAL at 05:24

## 2025-03-14 RX ADMIN — ENOXAPARIN SODIUM 40 MILLIGRAM(S): 100 INJECTION SUBCUTANEOUS at 05:25

## 2025-03-14 RX ADMIN — Medication 40 MILLIGRAM(S): at 05:24

## 2025-03-14 NOTE — DISCHARGE NOTE NURSING/CASE MANAGEMENT/SOCIAL WORK - PATIENT PORTAL LINK FT
You can access the FollowMyHealth Patient Portal offered by [facility] by registering at the following website: [email or web address]. By joining [employer]’s FollowMyHealth portal, you will also be able to view your health information using other applications (apps) compatible with our system.

## 2025-03-14 NOTE — CHART NOTE - NSCHARTNOTEFT_GEN_A_CORE
Commode:  Patient requires a commode at home due to: Hip fracture status post surgery repair on 3/12     Patient is confined to a single room without a bathroom or Patient is confined to a single level without a bathroom.

## 2025-03-14 NOTE — PROGRESS NOTE ADULT - SUBJECTIVE AND OBJECTIVE BOX
Patient is a 74y old  Female admitted s/p fall with Right hip fracture     she is seen in am , she is feeling well '  denies pain at rest , + when out of bed walks       ALLERGIES:  No Known Allergies    MEDICATIONS  (STANDING):  acetaminophen     Tablet .. 975 milliGRAM(s) Oral every 8 hours  amLODIPine   Tablet 2.5 milliGRAM(s) Oral daily  atorvastatin 40 milliGRAM(s) Oral at bedtime  carvedilol 3.125 milliGRAM(s) Oral every 12 hours  dextrose 5%. 1000 milliLiter(s) (50 mL/Hr) IV Continuous <Continuous>  dextrose 5%. 1000 milliLiter(s) (100 mL/Hr) IV Continuous <Continuous>  dextrose 50% Injectable 25 Gram(s) IV Push once  dextrose 50% Injectable 12.5 Gram(s) IV Push once  dextrose 50% Injectable 25 Gram(s) IV Push once  enoxaparin Injectable 40 milliGRAM(s) SubCutaneous every 24 hours  glucagon  Injectable 1 milliGRAM(s) IntraMuscular once  influenza  Vaccine (HIGH DOSE) 0.5 milliLiter(s) IntraMuscular once  insulin lispro (ADMELOG) corrective regimen sliding scale   SubCutaneous three times a day before meals  insulin lispro (ADMELOG) corrective regimen sliding scale   SubCutaneous at bedtime  memantine 10 milliGRAM(s) Oral two times a day  pantoprazole    Tablet 40 milliGRAM(s) Oral before breakfast  PARoxetine 20 milliGRAM(s) Oral daily  polyethylene glycol 3350 17 Gram(s) Oral at bedtime  senna 2 Tablet(s) Oral at bedtime  sodium chloride 0.9%. 1000 milliLiter(s) (100 mL/Hr) IV Continuous <Continuous>    MEDICATIONS  (PRN):  aluminum hydroxide/magnesium hydroxide/simethicone Suspension 30 milliLiter(s) Oral four times a day PRN Indigestion  dextrose Oral Gel 15 Gram(s) Oral once PRN Blood Glucose LESS THAN 70 milliGRAM(s)/deciliter  HYDROmorphone   Tablet 2 milliGRAM(s) Oral every 4 hours PRN Severe Pain (7 - 10)  magnesium hydroxide Suspension 30 milliLiter(s) Oral daily PRN Constipation  oxyCODONE    IR 5 milliGRAM(s) Oral every 3 hours PRN Mild Pain (1 - 3)  oxyCODONE    IR 10 milliGRAM(s) Oral every 3 hours PRN Moderate Pain (4 - 6)      Vital Signs Last 24 Hrs  T(C): 36.9 (14 Mar 2025 08:39), Max: 36.9 (14 Mar 2025 08:39)  T(F): 98.5 (14 Mar 2025 08:39), Max: 98.5 (14 Mar 2025 08:39)  HR: 66 (14 Mar 2025 08:39) (66 - 73)  BP: 155/77 (14 Mar 2025 08:39) (155/77 - 171/74)  BP(mean): --  RR: 17 (14 Mar 2025 08:39) (17 - 18)  SpO2: 94% (14 Mar 2025 08:39) (92% - 94%)    Parameters below as of 14 Mar 2025 08:39  Patient On (Oxygen Delivery Method): room air        PHYSICAL EXAM:  General: awake   Neck: supple   Lungs: CTA   Cardio: RRR, S1/S2, No murmur  Abdomen: Soft, Nontender, Nondistended; Bowel sounds present  Extremities: No calf tenderness, No pitting edema    LABS:                        9.0    11.83 )-----------( 254      ( 13 Mar 2025 04:36 )             27.2     03-13    138  |  105  |  24.0  ----------------------------<  148  4.3   |  20.0  |  1.30    Ca    8.7      13 Mar 2025 04:36  Phos  3.0     03-13  Mg     1.6     03-13    TPro  7.4  /  Alb  4.1  /  TBili  0.4  /  DBili  x   /  AST  17  /  ALT  12  /  AlkPhos  133  03-11          PT/INR - ( 11 Mar 2025 18:20 )   PT: 11.2 sec;   INR: 0.97 ratio         PTT - ( 11 Mar 2025 18:20 )  PTT:33.1 sec                        POCT Blood Glucose.: 166 mg/dL (13 Mar 2025 08:00)  POCT Blood Glucose.: 240 mg/dL (12 Mar 2025 21:31)  POCT Blood Glucose.: 175 mg/dL (12 Mar 2025 18:03)  POCT Blood Glucose.: 175 mg/dL (12 Mar 2025 13:12)      Urinalysis Basic - ( 13 Mar 2025 04:36 )    Color: x / Appearance: x / SG: x / pH: x  Gluc: 148 mg/dL / Ketone: x  / Bili: x / Urobili: x   Blood: x / Protein: x / Nitrite: x   Leuk Esterase: x / RBC: x / WBC x   Sq Epi: x / Non Sq Epi: x / Bacteria: x          RADIOLOGY & ADDITIONAL TESTS:

## 2025-03-14 NOTE — DISCHARGE NOTE NURSING/CASE MANAGEMENT/SOCIAL WORK - NSDCPEFALRISK_GEN_ALL_CORE
For information on Fall & Injury Prevention, visit: [email or web address] OR  [email or web address] OR  https://www.cdc.gov/steadi/patient.html

## 2025-03-14 NOTE — PROGRESS NOTE ADULT - REASON FOR ADMISSION
Right hip fracture

## 2025-03-14 NOTE — PROGRESS NOTE ADULT - ASSESSMENT
5yo F with PMHx DM, HTN presented to ED c/o right hip pain s/p mechanical fall. She had Xray showed proximal femoral Fx , s/p ORIF , IM nail           1-Right proximal femoral fracture   s/p mechanical fall at home   post up ORIF, hip, with intramedullary nail today   Pain control prn   OOB with PT   DVT prophylaxis      2- postoperative  anemia   due to fracture blood loss likely   asymptomatic   iron iv x1 today     3-Prerenal azotemia   s/p iv fluid   improved     4-h/o HTN   cont coreg , increase amlodipine for better control   BP is high   resume home lisinopril     5-DM, non insulin dependent   -Hold home metformin, Glipizide   -ISS, hypoglycemic protocol     6-Anxiety/depression   -Paroxetine 20mg po daily     DVT ppx   lovenox sq 40 mg daily       she wants to go home likely tomorrow              
3yo F with PMHx DM, HTN presented to ED c/o right hip pain s/p mechanical fall.She had Xray showed proximal femoral Fx , s/p ORIF , IM nail           1-Right proximal femoral fracture   s/p mechanical fall at home   post up ORIF, hip, with intramedullary nail today   Pain control prn   post up voiding   DC IVF   OOB with PT   DVT prophylaxis in am     no urinary retention     2- post up anemia   due to fracture blood loss likely   check iron studies   if Hb < 9 will txs 1 unit prior Dc likely     3-Prerenal azotemia   cont IVF   improving   avoid nephrotoxic meds     4-h/o HTN   cont home meds with holding parameters   hold  lisinopril due to renal disease   avoid hypotension     5-DM, non insulin dependent   -Hold home metformin, Glipizide   -ISS, hypoglycemic protocol     6-Anxiety/depression   -Paroxetine 20mg po daily     DVT ppx   lovenox sq 40 mg daily       Dispo: to [facility] vs home in 1-2 days              
75yo F with PMHx DM, HTN presented to ED c/o right hip pain s/p mechanical fall.        1*Right proximal femoral fracture   -s/p mechanical fall at home tripped   she is post up ORIF, hip, with intramedullary nail today   -Pain control prn   monitor urine out put   IVF ,  OOB with PT   DVT prophylaxis in am     2- retention suspected   not urinated  yet   will cont to monitor bladder scan   straight cath as needed   OOB   cont IVF    3-Prerenal azotemia   IVF continue   monitor lytes daily cr   urine output     4-h/o HTN   cont home meds with holding parameters   hold  lisinopril   avoid hypotension     5-DM, non insulin dependent   -Hold home metformin, Glipizide   -ISS, hypoglycemic protocol     6-  Anxiety/depression   -Paroxetine 20mg po daily     DVT ppx   lovenox post op per ortho start in am     Dispo: to [facility] likely in 3-4 days

## 2025-03-14 NOTE — DISCHARGE NOTE NURSING/CASE MANAGEMENT/SOCIAL WORK - FINANCIAL ASSISTANCE
[facility] provides services at a reduced cost to those who are determined to be eligible through [facility]’s financial assistance program. Information regarding [facility]’s financial assistance program can be found by going to [email or web address] or by calling [phone].

## 2025-03-17 LAB — SURGICAL PATHOLOGY STUDY: SIGNIFICANT CHANGE UP

## 2025-03-19 RX ORDER — MEMANTINE HYDROCHLORIDE 21 MG/1
1 CAPSULE, EXTENDED RELEASE ORAL
Refills: 0 | DISCHARGE

## 2025-03-19 RX ORDER — ACETAMINOPHEN 500 MG/5ML
2 LIQUID (ML) ORAL
Refills: 0 | DISCHARGE

## 2025-03-19 RX ORDER — SENNA 187 MG
2 TABLET ORAL
Refills: 0 | DISCHARGE

## 2025-03-19 NOTE — CHART NOTE - NSCHARTNOTEFT_GEN_A_CORE
Post-Discharge Medication Review: Completed	  		  Patient's preferred pharmacy was updated in OMR: [facility] in [place]	  	  Patient contacted to offer medication counseling post-discharge. Medication reconciliation completed. Per patient, medications include:	  	  1.	amLODIPine 5 mg oral tablet 1 tab(s) orally once a day  2.	atorvastatin 40 mg tablet 1 tab(s) orally once a day (at bedtime)  3.	carvedilol 3.125 mg oral tablet 1 tab(s) orally every 12 hours  4.	enoxaparin 40 mg/0.4 mL injectable solution 40 milligram(s) subcutaneously once a day meds to bed by 4-5 pm  5.	lisinopril 10 mg oral tablet 1 tab(s) orally once a day  6.	memantine 10 mg oral tablet 1 tab(s) orally 2 times a day  7.	metformin  mg tablet,extended release 24 hr 4 tab(s) orally once a day  8.	pantoprazole 40 mg oral delayed release tablet 1 tab(s) orally once a day (before a meal)  9.	paroxetine 20 mg tablet 1 tab(s) orally once a day  10.	ferrous sulfate 325 mg (65 mg elemental iron) oral delayed release tablet 1 tab(s) orally once a day 	  Medications added to OMR (updated per discussion with patient):	  11.	acetaminophen 500 mg oral tablet 2 tab(s) orally every 8 hours as needed for pain  12.	senna leaf extract oral tablet 2 tab(s) orally once a day (at bedtime) as needed for constipation   	  Medications removed from OMR (updated per discussion with patient):	  1.	acetaminophen 325 mg oral tablet 3 tab(s) orally every 8 hours as needed for hip pain  2.	polyethylene glycol 3350 oral powder for reconstitution 17 gram(s) orally once a day (at bedtime)  3.	senna leaf extract oral tablet 2 tab(s) orally once a day (at bedtime)   		  Medication name, indication, administration, side effect, and monitoring reviewed for new medications during post discharge counseling visit with patient. Patient demonstrated understanding. Counseling offered for all medications.	  	  Patient's amlodipine was increased from 2.5 to 5 mg daily on discharge, however a prescription for the new dose was not sent. Patient will take two 2.5 mg tablets for now but does not want to run out. Reached out to the inpatient team to request a prescription for the 5 mg tablets to patient's preferred pharmacy.	  	  Edilia Crawley, PharmD	  Clinical Pharmacy Specialist, Pharmacy Telehealth Team	  Can be reached via MS Teams or [phone]

## 2025-03-26 RX ORDER — AMLODIPINE BESYLATE 10 MG/1
1 TABLET ORAL
Qty: 30 | Refills: 0
Start: 2025-03-26 | End: 2025-04-24

## 2025-03-26 RX ORDER — AMLODIPINE BESYLATE 10 MG/1
1 TABLET ORAL
Refills: 0
Start: 2025-03-26

## 2025-06-18 NOTE — H&P PST ADULT - ATTENDING PHYSICIAN: I HAVE REVIEWED THE CLINICAL DOCUMENTATION AND AGREE WITH THE ABOVE NOTE
"HPI       Cataract    In both eyes.  Associated symptoms include blurred vision, glare, haloes (especially from lights in bathroom) and a need for brighter lights (sometimes on her phone).  Negative for starburst and double vision.  Severity is moderate.  Onset was gradual.  Duration of months.  Frequency is constant.  Since onset it is gradually worsening.  Treatments tried include glasses.  Response to treatment was no improvement. Additional comments: Referral from Dr. Misha Morales for evaluation of cataract each eye.     Patient states Dr. Morales did the whole evaluation, but he was unable to schedule her for cataract surgery since they don't have a lift. \"My left eye is worse.\" Denies floaters or flashes. Denies eye pain or dryness. \"I used to wear glasses for driving, but I don't drive anymore.\" Denies eye surgery. Denies eye trauma.     Kita Woods, COT 1:10 PM 06/18/2025            Last edited by Kita Woods on 6/18/2025  1:10 PM.         Review of systems for the eyes was negative other than the pertinent positives/negatives listed in the HPI.      Assessment & Plan    HPI:  Mariela Allen is a 73 year old female with history of MS who is wheelchairbound requiring naye lift, breast CA, presents from [facility] eye clinic for cataract evaluation - they are unable to accommodate the patient given she is wheel chair bound and cannot transfer without lift. She has noted decreased vision left eye > right eye and need for brighter lights.    POHx: None significant  PMHx: Malignant neoplasia of right breast, Multiple sclerosis  Current Medications:   Current Outpatient Medications   Medication Sig Dispense Refill    baclofen (LIORESAL) 10 MG tablet Take 30 mg by mouth 4 times daily (3 x 10 mg = 30 mg)      cyanocobalamin (VITAMIN B-12) 1000 MCG tablet Take 1,000 mcg by mouth daily      denosumab (PROLIA) 60 MG/ML SOSY injection Inject 60 mg Subcutaneous every 6 months      diazepam (VALIUM) 5 MG tablet Take 5 mg " by mouth every 6 hours as needed for anxiety      gabapentin (NEURONTIN) 100 MG capsule Take 100 mg by mouth 3 times daily.      Multiple Vitamins-Minerals (CERTAVITE SENIOR/ANTIOXIDANT) TABS Take 1 tablet by mouth every morning      senna-docusate (SENOKOT-S/PERICOLACE) 8.6-50 MG tablet Take 1-2 tablets by mouth 2 times daily Take while on oral narcotics to prevent or treat constipation. 15 tablet 0     No current facility-administered medications for this visit.     FHx: None significant  PSHx: No eye surgical history    Current Eye Medications:      Assessment & Plan:  (H25.813) Combined form of senile cataract of both eyes  (primary encounter diagnosis)  Special equipment/needs:  Eye: left  Anesthesia: MAC  Dilates to: 6  Iris expansion:  unliekly  Pseudoexfoliation: No  Trypan Blue: No  Trauma: No    Able lay to flat: Yes needs Zoe lift  Blood Thinner: No   Tamsulosin/Doxazosin/Imipramine/chlorpromazine/brinzolamide/salbutamol: No  DM: No  Guttae: No    Dominant Eye:     Plan: -0.50/-0.75    Cataract is believed to be significantly contributing to patient's visual impairment. Cataract surgery recommended and expected to improve vision. Vision is not correctable by glasses or other nonsurgical measures. R/B/A were discussed with the patient. These included, but are not limited to: bleeding, infection, loss of vision, loss of the eye, need for more surgery, glaucoma, retinal detachment, need for glasses, corneal edema. The patient voiced understanding and wishes to proceed. All lens options were discussed with the patient including monofocal lenses, multifocal lenses, and toric lenses. The risks and benefits of each were discussed, including halos, glare, and possible need for glasses. No guarantees about vision after surgery were given. The patient voiced understanding and wishes to proceed    Proceed with CE/IOL left eye followed by right eye .    (H52.13,  H52.203,  H52.4) Myopia of both eyes with  astigmatism and presbyopia  Hold pending Cataract extraction/iol     No follow-ups on file.        Bhupinder Curry MD     Attending Physician Attestation:  Complete documentation of historical and exam elements from today's encounter can be found in the full encounter summary report (not reduplicated in this progress note).  I personally obtained the chief complaint(s) and history of present illness.  I confirmed and edited as necessary the review of systems, past medical/surgical history, family history, social history, and examination findings as documented by others; and I examined the patient myself.  I personally reviewed the relevant tests, images, and reports as documented above.  I formulated and edited as necessary the assessment and plan and discussed the findings and management plan with the patient and family. - Blayne Blair MD          Statement Selected

## (undated) DEVICE — PACK MINOR WITH LAP

## (undated) DEVICE — DRAPE SPLIT SHEETS 77X108"

## (undated) DEVICE — PACK NEURO SO SIDE

## (undated) DEVICE — DRSG MEPILEX 10 X 25CM (4 X 10") POST OP AG SILVER

## (undated) DEVICE — SPONGE PEANUT AUTO COUNT

## (undated) DEVICE — SOL IRR POUR NS 0.9% 1000ML

## (undated) DEVICE — GLV 8 PROTEXIS (BLUE)

## (undated) DEVICE — DRAPE MICROSCOPE LEICA  26X150"

## (undated) DEVICE — SOL IRR POUR H2O 1000ML

## (undated) DEVICE — NDL HYPO SAFE 22G X 1.5" (BLACK)

## (undated) DEVICE — SUT VICRYL 3-0 18" CP-2 UNDYED

## (undated) DEVICE — SUT VICRYL PLUS 0 27" CT-2 UNDYED

## (undated) DEVICE — NDL SPINAL 18G X 3.5"

## (undated) DEVICE — DRAPE C ARM UNIVERSAL

## (undated) DEVICE — ELCTR GROUNDING PAD ADULT COVIDIEN

## (undated) DEVICE — SUT MONOCRYL 4-0 27" PS-2 UNDYED

## (undated) DEVICE — VENODYNE/SCD SLEEVE CALF MEDIUM

## (undated) DEVICE — WRAP COMPRESSION CALF MED

## (undated) DEVICE — SUT MONOCRYL 2-0 27" SH UNDYED

## (undated) DEVICE — DRSG DERMABOND PRINEO 60CM

## (undated) DEVICE — Device

## (undated) DEVICE — DRAPE CRANIOTOMY W POUCH 100X104"

## (undated) DEVICE — GLV 7.5 PROTEXIS (WHITE)

## (undated) DEVICE — NDL HYPO REGULAR BEVEL 25G X 1.5" (BLUE)

## (undated) DEVICE — DRAPE HALF SHEET 40X57"

## (undated) DEVICE — GLV 7 PROTEXIS

## (undated) DEVICE — WARMING BLANKET UPPER ADULT

## (undated) DEVICE — DRAPE SHOWER CURTAIN ISOLATION

## (undated) DEVICE — SUT VICRYL 2-0 18" CP-2 UNDYED

## (undated) DEVICE — LEGEND BALL 2MMX10CM